# Patient Record
Sex: MALE | Race: WHITE | NOT HISPANIC OR LATINO | Employment: OTHER | URBAN - NONMETROPOLITAN AREA
[De-identification: names, ages, dates, MRNs, and addresses within clinical notes are randomized per-mention and may not be internally consistent; named-entity substitution may affect disease eponyms.]

---

## 2023-03-08 LAB
APPEARANCE, URINE: CLEAR
BILIRUBIN, URINE: NEGATIVE
BLOOD, URINE: NEGATIVE
COLOR, URINE: YELLOW
GLUCOSE, URINE: NEGATIVE MG/DL
KETONES, URINE: NEGATIVE MG/DL
LEUKOCYTE ESTERASE, URINE: NEGATIVE
NITRITE, URINE: NEGATIVE
PH, URINE: 5 (ref 5–8)
PROTEIN, URINE: NEGATIVE MG/DL
SPECIFIC GRAVITY, URINE: 1.02 (ref 1–1.03)
UROBILINOGEN, URINE: <2 MG/DL (ref 0–1.9)

## 2023-06-06 LAB
AMPHETAMINE (PRESENCE) IN URINE BY SCREEN METHOD: NORMAL
BARBITURATES PRESENCE IN URINE BY SCREEN METHOD: NORMAL
BENZODIAZEPINE (PRESENCE) IN URINE BY SCREEN METHOD: NORMAL
CANNABINOIDS IN URINE BY SCREEN METHOD: NORMAL
COCAINE (PRESENCE) IN URINE BY SCREEN METHOD: NORMAL
DRUG SCREEN COMMENT URINE: NORMAL
FENTANYL URINE: NORMAL
METHADONE (PRESENCE) IN URINE BY SCREEN METHOD: NORMAL
OPIATES (PRESENCE) IN URINE BY SCREEN METHOD: NORMAL
OXYCODONE (PRESENCE) IN URINE BY SCREEN METHOD: NORMAL
PHENCYCLIDINE (PRESENCE) IN URINE BY SCREEN METHOD: NORMAL

## 2023-06-19 ENCOUNTER — HOSPITAL ENCOUNTER (OUTPATIENT)
Dept: DATA CONVERSION | Facility: HOSPITAL | Age: 88
End: 2023-06-19
Attending: ANESTHESIOLOGY | Admitting: ANESTHESIOLOGY
Payer: COMMERCIAL

## 2023-06-19 DIAGNOSIS — M48.062 SPINAL STENOSIS, LUMBAR REGION WITH NEUROGENIC CLAUDICATION: ICD-10-CM

## 2023-06-19 DIAGNOSIS — M54.16 RADICULOPATHY, LUMBAR REGION: ICD-10-CM

## 2023-06-19 DIAGNOSIS — M54.17 RADICULOPATHY, LUMBOSACRAL REGION: ICD-10-CM

## 2023-07-24 LAB
ALANINE AMINOTRANSFERASE (SGPT) (U/L) IN SER/PLAS: 12 U/L (ref 10–52)
ALBUMIN (G/DL) IN SER/PLAS: 4.1 G/DL (ref 3.4–5)
ALKALINE PHOSPHATASE (U/L) IN SER/PLAS: 76 U/L (ref 33–136)
ANION GAP IN SER/PLAS: 12 MMOL/L (ref 10–20)
ASPARTATE AMINOTRANSFERASE (SGOT) (U/L) IN SER/PLAS: 13 U/L (ref 9–39)
BASOPHILS (10*3/UL) IN BLOOD BY AUTOMATED COUNT: 0.02 X10E9/L (ref 0–0.1)
BASOPHILS/100 LEUKOCYTES IN BLOOD BY AUTOMATED COUNT: 0.3 % (ref 0–2)
BILIRUBIN TOTAL (MG/DL) IN SER/PLAS: 1.4 MG/DL (ref 0–1.2)
CALCIUM (MG/DL) IN SER/PLAS: 8.8 MG/DL (ref 8.6–10.3)
CARBON DIOXIDE, TOTAL (MMOL/L) IN SER/PLAS: 28 MMOL/L (ref 21–32)
CHLORIDE (MMOL/L) IN SER/PLAS: 99 MMOL/L (ref 98–107)
CREATININE (MG/DL) IN SER/PLAS: 1.4 MG/DL (ref 0.5–1.3)
EOSINOPHILS (10*3/UL) IN BLOOD BY AUTOMATED COUNT: 0.1 X10E9/L (ref 0–0.4)
EOSINOPHILS/100 LEUKOCYTES IN BLOOD BY AUTOMATED COUNT: 1.7 % (ref 0–6)
ERYTHROCYTE DISTRIBUTION WIDTH (RATIO) BY AUTOMATED COUNT: 13.5 % (ref 11.5–14.5)
ERYTHROCYTE MEAN CORPUSCULAR HEMOGLOBIN CONCENTRATION (G/DL) BY AUTOMATED: 33 G/DL (ref 32–36)
ERYTHROCYTE MEAN CORPUSCULAR VOLUME (FL) BY AUTOMATED COUNT: 97 FL (ref 80–100)
ERYTHROCYTES (10*6/UL) IN BLOOD BY AUTOMATED COUNT: 4.36 X10E12/L (ref 4.5–5.9)
GFR MALE: 48 ML/MIN/1.73M2
GLUCOSE (MG/DL) IN SER/PLAS: 98 MG/DL (ref 74–99)
HEMATOCRIT (%) IN BLOOD BY AUTOMATED COUNT: 42.1 % (ref 41–52)
HEMOGLOBIN (G/DL) IN BLOOD: 13.9 G/DL (ref 13.5–17.5)
IMMATURE GRANULOCYTES/100 LEUKOCYTES IN BLOOD BY AUTOMATED COUNT: 0.2 % (ref 0–0.9)
LEUKOCYTES (10*3/UL) IN BLOOD BY AUTOMATED COUNT: 6 X10E9/L (ref 4.4–11.3)
LYMPHOCYTES (10*3/UL) IN BLOOD BY AUTOMATED COUNT: 1.4 X10E9/L (ref 0.8–3)
LYMPHOCYTES/100 LEUKOCYTES IN BLOOD BY AUTOMATED COUNT: 23.3 % (ref 13–44)
MONOCYTES (10*3/UL) IN BLOOD BY AUTOMATED COUNT: 0.66 X10E9/L (ref 0.05–0.8)
MONOCYTES/100 LEUKOCYTES IN BLOOD BY AUTOMATED COUNT: 11 % (ref 2–10)
NEUTROPHILS (10*3/UL) IN BLOOD BY AUTOMATED COUNT: 3.83 X10E9/L (ref 1.6–5.5)
NEUTROPHILS/100 LEUKOCYTES IN BLOOD BY AUTOMATED COUNT: 63.5 % (ref 40–80)
PLATELETS (10*3/UL) IN BLOOD AUTOMATED COUNT: 183 X10E9/L (ref 150–450)
POTASSIUM (MMOL/L) IN SER/PLAS: 4.1 MMOL/L (ref 3.5–5.3)
PROTEIN TOTAL: 6.5 G/DL (ref 6.4–8.2)
SODIUM (MMOL/L) IN SER/PLAS: 135 MMOL/L (ref 136–145)
UREA NITROGEN (MG/DL) IN SER/PLAS: 20 MG/DL (ref 6–23)

## 2023-07-25 LAB
ESTIMATED AVERAGE GLUCOSE FOR HBA1C: 123 MG/DL
HEMOGLOBIN A1C/HEMOGLOBIN TOTAL IN BLOOD: 5.9 %

## 2023-08-07 ENCOUNTER — HOSPITAL ENCOUNTER (OUTPATIENT)
Dept: DATA CONVERSION | Facility: HOSPITAL | Age: 88
End: 2023-08-07
Attending: ANESTHESIOLOGY | Admitting: ANESTHESIOLOGY
Payer: COMMERCIAL

## 2023-08-07 DIAGNOSIS — M47.816 SPONDYLOSIS WITHOUT MYELOPATHY OR RADICULOPATHY, LUMBAR REGION: ICD-10-CM

## 2023-08-07 DIAGNOSIS — M47.817 SPONDYLOSIS WITHOUT MYELOPATHY OR RADICULOPATHY, LUMBOSACRAL REGION: ICD-10-CM

## 2023-08-25 LAB
COLLECTION DURATION OF URINE: 24 HR
CREATININE (MG/24HR) IN 24 HOUR URINE: 1.28 G/24H (ref 0.87–2.41)
CREATININE (MG/DL) IN URINE: 67.2 MG/DL (ref 20–370)
VOLUME OF URINE: 1900 ML

## 2023-09-06 LAB
AMPHETAMINE (PRESENCE) IN URINE BY SCREEN METHOD: NORMAL
BARBITURATES PRESENCE IN URINE BY SCREEN METHOD: NORMAL
BENZODIAZEPINE (PRESENCE) IN URINE BY SCREEN METHOD: NORMAL
CANNABINOIDS IN URINE BY SCREEN METHOD: NORMAL
COCAINE (PRESENCE) IN URINE BY SCREEN METHOD: NORMAL
DRUG SCREEN COMMENT URINE: NORMAL
FENTANYL URINE: NORMAL
OPIATES (PRESENCE) IN URINE BY SCREEN METHOD: NORMAL
OXYCODONE (PRESENCE) IN URINE BY SCREEN METHOD: NORMAL
PHENCYCLIDINE (PRESENCE) IN URINE BY SCREEN METHOD: NORMAL

## 2023-09-07 VITALS — WEIGHT: 258.6 LBS | HEIGHT: 70 IN | BODY MASS INDEX: 37.02 KG/M2

## 2023-09-29 VITALS — HEIGHT: 70 IN | BODY MASS INDEX: 37.02 KG/M2 | WEIGHT: 258.6 LBS

## 2023-09-30 DIAGNOSIS — M43.06 LUMBAR SPONDYLOLYSIS: Primary | ICD-10-CM

## 2023-09-30 RX ORDER — SODIUM CHLORIDE, SODIUM LACTATE, POTASSIUM CHLORIDE, CALCIUM CHLORIDE 600; 310; 30; 20 MG/100ML; MG/100ML; MG/100ML; MG/100ML
100 INJECTION, SOLUTION INTRAVENOUS CONTINUOUS
Status: CANCELLED | OUTPATIENT
Start: 2023-10-02

## 2023-09-30 RX ORDER — CIPROFLOXACIN 500 MG/1
500 TABLET ORAL ONCE
Status: CANCELLED | OUTPATIENT
Start: 2023-10-02

## 2023-10-01 PROBLEM — B35.1 MYCOTIC TOENAILS: Status: ACTIVE | Noted: 2023-10-01

## 2023-10-01 PROBLEM — I10 HYPERTENSION: Status: ACTIVE | Noted: 2023-10-01

## 2023-10-01 PROBLEM — M24.559 HIP FLEXOR TIGHTNESS: Status: ACTIVE | Noted: 2023-10-01

## 2023-10-01 PROBLEM — M54.50 LOW BACK PAIN: Status: ACTIVE | Noted: 2023-10-01

## 2023-10-01 PROBLEM — M54.9 ACUTE BACK PAIN: Status: ACTIVE | Noted: 2023-10-01

## 2023-10-01 PROBLEM — E55.9 VITAMIN D DEFICIENCY: Status: ACTIVE | Noted: 2023-10-01

## 2023-10-01 PROBLEM — I73.9 PVD (PERIPHERAL VASCULAR DISEASE) (CMS-HCC): Status: ACTIVE | Noted: 2023-10-01

## 2023-10-01 PROBLEM — Z91.89 AT RISK FOR BLEEDING: Status: ACTIVE | Noted: 2023-10-01

## 2023-10-01 PROBLEM — N40.0 BPH (BENIGN PROSTATIC HYPERPLASIA): Status: ACTIVE | Noted: 2023-10-01

## 2023-10-01 PROBLEM — G89.29 CHRONIC TOE PAIN, BILATERAL: Status: ACTIVE | Noted: 2023-10-01

## 2023-10-01 PROBLEM — M10.9 GOUT: Status: ACTIVE | Noted: 2023-10-01

## 2023-10-01 PROBLEM — M79.674 CHRONIC TOE PAIN, BILATERAL: Status: ACTIVE | Noted: 2023-10-01

## 2023-10-01 PROBLEM — M79.675 CHRONIC TOE PAIN, BILATERAL: Status: ACTIVE | Noted: 2023-10-01

## 2023-10-01 PROBLEM — E78.5 HLD (HYPERLIPIDEMIA): Status: ACTIVE | Noted: 2023-10-01

## 2023-10-01 PROBLEM — R06.89 IMPAIRED GAS EXCHANGE: Status: ACTIVE | Noted: 2023-10-01

## 2023-10-01 PROBLEM — I82.90 VENOUS THROMBOSIS: Status: ACTIVE | Noted: 2023-10-01

## 2023-10-01 PROBLEM — B35.3 TINEA PEDIS OF BOTH FEET: Status: ACTIVE | Noted: 2023-10-01

## 2023-10-01 RX ORDER — ASPIRIN 81 MG/1
1 TABLET ORAL DAILY
COMMUNITY
Start: 2018-02-20

## 2023-10-01 RX ORDER — CIPROFLOXACIN 500 MG/1
500 TABLET ORAL 2 TIMES DAILY
COMMUNITY
Start: 2023-05-19 | End: 2023-11-24 | Stop reason: WASHOUT

## 2023-10-01 RX ORDER — HYDROCODONE BITARTRATE AND ACETAMINOPHEN 5; 325 MG/1; MG/1
1 TABLET ORAL EVERY 4 HOURS PRN
COMMUNITY
End: 2023-11-24 | Stop reason: WASHOUT

## 2023-10-01 RX ORDER — ALLOPURINOL 300 MG/1
300 TABLET ORAL DAILY
COMMUNITY

## 2023-10-01 RX ORDER — MELOXICAM 7.5 MG/1
7.5 TABLET ORAL DAILY
COMMUNITY
Start: 2023-07-18 | End: 2023-11-24 | Stop reason: WASHOUT

## 2023-10-01 RX ORDER — TAMSULOSIN HYDROCHLORIDE 0.4 MG/1
0.4 CAPSULE ORAL DAILY
COMMUNITY

## 2023-10-01 RX ORDER — LOVASTATIN 40 MG/1
40 TABLET ORAL NIGHTLY
COMMUNITY

## 2023-10-01 RX ORDER — LISINOPRIL AND HYDROCHLOROTHIAZIDE 12.5; 2 MG/1; MG/1
1 TABLET ORAL DAILY
COMMUNITY

## 2023-10-01 RX ORDER — GABAPENTIN 100 MG/1
100 CAPSULE ORAL NIGHTLY
COMMUNITY
Start: 2023-07-08 | End: 2023-11-24 | Stop reason: WASHOUT

## 2023-10-01 RX ORDER — ERGOCALCIFEROL 1.25 MG/1
1 CAPSULE ORAL
COMMUNITY
Start: 2016-03-01 | End: 2023-11-24 | Stop reason: WASHOUT

## 2023-10-01 RX ORDER — LISINOPRIL 20 MG/1
20 TABLET ORAL DAILY
COMMUNITY
End: 2023-11-24 | Stop reason: WASHOUT

## 2023-10-01 NOTE — OP NOTE
Post Operative Note:     PreOp Diagnosis: Lumbar spondylosis   Post-Procedure Diagnosis: Same   Procedure: 1.  Bilateral diagnostic medial branch  nerve block L4-5 L5-S1 with fluoroscopy  2.   3.   4.   5.   Surgeon: Dr. Diggs   Resident/Fellow/Other Assistant: None   Anesthesia: IV conscious sedation   Estimated Blood Loss (mL): none   Specimen: no   Findings: None     Operative Report Dictated:  Dictation: not applicable - note contains Operative  Report   Operative Report:    89-year-old  male here today for diagnostic medial branch nerve block.  Risk and benefits of today's procedure as well as COVID-19 was discussed and  a signed consent was obtained prior to his entry into the operating room.  Operation Performed: Diagnostic bilateral L4-5 L5-S1 facet joint injection with fluoroscopic guidance.  Preoperative/postop diagnosis:  Lumbar spondylosis without myelopathy L4-5, L5-S1     Complications: None  Estimated blood loss: None  Anesthesia: IV conscious sedation    Procedure:  The patient was taken to the operating room and placed in the prone position where sterile prep was done and drapes were applied. Supplemental oxygen was given to the patient at 2 L/m  per nasal cannula. Patients blood pressure was monitored  throughout the case and remained stable. Pulse oximetry remained at 100% throughout the procedure. A  25-gauge 3 1/2 inch spinal needle was then inserted into the inferior aspect of the facet joints at L4-5 L5-S1. Aspiration was negative for the cerebral  spinal fluid or blood. There were no paresthesias, dysesthesias, or severe pain noted. A total of 1mL of preservative-free 0.25% Marcaine was injected into each of the 4 facet joints. The needles were then removed and a sterile bandage with Neosporin  Ointment was applied over the puncture sites. The patient was taken to the recovery room in awake and in stable condition.      Electronic Signatures:  Duong Diggs ()  (Signed  07-Aug-2023 10:41)   Authored: Post Operative Note, Note Completion      Last Updated: 07-Aug-2023 10:41 by Duong Diggs (DO)

## 2023-10-02 NOTE — OP NOTE
Post Operative Note:     PreOp Diagnosis: Lumbosacral radiculopathy   Post-Procedure Diagnosis: Same   Procedure: 1.  Lumbar epidural steroid injection  # 2 with fluoroscopy  2.   3.   4.   5.   Surgeon: Dr. Diggs   Resident/Fellow/Other Assistant: None   Anesthesia: IV conscious sedation   Estimated Blood Loss (mL): none   Specimen: no   Findings: None     Operative Report Dictated:  Dictation: not applicable - note contains Operative  Report   Operative Report:    89-year-old  male here today to undergo a lumbar MYRANDA #2 with fluoroscopy.  He has severe spinal stenosis at L4-5 L5-S1 level resulting in lumbosacral  radiculopathy.  Risk and benefits of today's procedure as well as COVID-19 was discussed with him and a signed consent was obtained prior to him entering the OR.  Operation Performed:  Lumbar epidural steroid injection with fluoroscopic guidance.  Preoperative/postop diagnosis: Lumbosacral radiculopathy  Complications: None  Anesthesia: IV conscious sedation  Estimated blood loss: None  Procedure:  Risk and benefits of the procedure were discussed with the patient, who then consented to the procedure.  The patient was taken to the operating room and placed in the prone position where a sterile prep was done and drapes were applied.     Supplemental oxygen was given to the patient at 2 L/m and blood pressure was monitored throughout the case. Pulse oximetry remained at 100% throughout the procedure. An 18-gauge Tuohy needle was inserted into the lumbar epidural space at L5-S1.  Aspiration  was negative for cerebrospinal fluid or blood. There were no paresthesias dysesthesias or severe pain noted. Two mL of Omnipaque 240 mg contrast media was injected and outlined the epidural space. This was visualized in both AP and lateral views. Methylprednisolone  acetate (Depo-Medrol) 80 milligrams in 0.9% sodium chloride 10 mL was then injected and the needle was removed. A sterile bandage with  Neosporin ointment was applied over the puncture site, and the patient was taken to the recovery room in stable and  satisfactory condition. Home-going instructions were given to the patient prior to discharge.      Electronic Signatures:  Duong Diggs ()  (Signed 19-Jun-2023 09:57)   Authored: Post Operative Note, Note Completion      Last Updated: 19-Jun-2023 09:57 by Duong Diggs ()

## 2023-10-19 DIAGNOSIS — M47.817 FACET ARTHROPATHY, LUMBOSACRAL: Primary | ICD-10-CM

## 2023-10-23 ENCOUNTER — APPOINTMENT (OUTPATIENT)
Dept: PAIN MEDICINE | Facility: HOSPITAL | Age: 88
End: 2023-10-23
Payer: MEDICARE

## 2023-11-01 ENCOUNTER — OFFICE VISIT (OUTPATIENT)
Dept: PODIATRY | Facility: CLINIC | Age: 88
End: 2023-11-01
Payer: MEDICARE

## 2023-11-01 DIAGNOSIS — M79.671 FOOT PAIN, BILATERAL: ICD-10-CM

## 2023-11-01 DIAGNOSIS — I73.9 PVD (PERIPHERAL VASCULAR DISEASE) (CMS-HCC): Primary | ICD-10-CM

## 2023-11-01 DIAGNOSIS — B35.1 MYCOTIC TOENAILS: ICD-10-CM

## 2023-11-01 DIAGNOSIS — M79.672 FOOT PAIN, BILATERAL: ICD-10-CM

## 2023-11-01 PROCEDURE — 3078F DIAST BP <80 MM HG: CPT | Performed by: PODIATRIST

## 2023-11-01 PROCEDURE — 11721 DEBRIDE NAIL 6 OR MORE: CPT | Performed by: PODIATRIST

## 2023-11-01 PROCEDURE — 3077F SYST BP >= 140 MM HG: CPT | Performed by: PODIATRIST

## 2023-11-01 PROCEDURE — 1160F RVW MEDS BY RX/DR IN RCRD: CPT | Performed by: PODIATRIST

## 2023-11-01 PROCEDURE — 1159F MED LIST DOCD IN RCRD: CPT | Performed by: PODIATRIST

## 2023-11-01 PROCEDURE — 1125F AMNT PAIN NOTED PAIN PRSNT: CPT | Performed by: PODIATRIST

## 2023-11-01 NOTE — PROGRESS NOTES
This is a 89 y.o. male est patient for foot pain    History of Present Illness:   Patient states they are here for foot exam  Denies NTB to feet  Unable to safely trim nails on own      Past Medical History  Past Medical History:   Diagnosis Date    Personal history of other diseases of the musculoskeletal system and connective tissue 10/13/2015    History of gout    Personal history of other endocrine, nutritional and metabolic disease 10/13/2015    History of hyperlipidemia    Personal history of other malignant neoplasm of large intestine 10/13/2015    History of malignant neoplasm of colon    Personal history of urinary (tract) infections 04/20/2017    History of urinary tract infection       Medications and Allergies have been reviewed.    Review Of Systems:  GENERAL: No weight loss, malaise or fevers.  HEENT: Negative for frequent or significant headaches,   RESPIRATORY: Negative for cough, wheezing or shortness of breath.  CARDIOVASCULAR: Negative for chest pain, leg swelling or palpitations.    Physical Exam:  Patient is a pleasant, cooperative, well developed 89 y.o.  adult male. The patient is alert and oriented to time, place and person.   Patient has normal affect and mood.    Examination of Both Lower Extremities:   Objective:   Vasc: DP and PT pulses are palpable bilateral.  CFT is less than 3 seconds bilateral.  Skin temperature is warm to cool proximal to distal bilateral.  Varicosities noted. NO hair growth noted    Neuro: Vibratory, light touch and proprioception are intact bilateral.      Derm: Nails 1-5 bilateral are intact thick and discolored.  Skin is supple with normal texture and turgor noted.  Webspaces are clean, dry and intact bilateral.  There are no hyperkeratoses, ulcerations, verruca or other lesions noted.      Ortho: Muscle strength is 5/5 for all pedal groups tested.      A comprehensive history and physical exam was performed. The patient was educated on clinical and radiographic  findings, diagnosis, and treatment plans.    1. PVD (peripheral vascular disease) (CMS/HCC)        2. Mycotic toenails        3. Foot pain, bilateral              Patient educated on proper  foot care.  Nails 1-5 b/l were debrided in thickness and length with nail cutting forceps.  Ok to keep trimmed and filed down.   Patient to follow up in 6 mos or sooner if any problems arise.   Patient was in agreement to this plan. All questions answered.      Kika Wan DPM  [phone]  [facility]  Fax: [phone]

## 2023-11-09 PROBLEM — M47.817 FACET ARTHROPATHY, LUMBOSACRAL: Status: ACTIVE | Noted: 2023-11-09

## 2023-11-13 ENCOUNTER — HOSPITAL ENCOUNTER (OUTPATIENT)
Dept: PAIN MEDICINE | Facility: HOSPITAL | Age: 88
Discharge: HOME | End: 2023-11-13
Payer: MEDICARE

## 2023-11-13 ENCOUNTER — APPOINTMENT (OUTPATIENT)
Dept: PAIN MEDICINE | Facility: HOSPITAL | Age: 88
End: 2023-11-13
Payer: COMMERCIAL

## 2023-11-13 ENCOUNTER — HOSPITAL ENCOUNTER (OUTPATIENT)
Dept: RADIOLOGY | Facility: HOSPITAL | Age: 88
Discharge: HOME | End: 2023-11-13
Payer: MEDICARE

## 2023-11-13 VITALS
RESPIRATION RATE: 17 BRPM | TEMPERATURE: 98.2 F | OXYGEN SATURATION: 97 % | HEIGHT: 68 IN | WEIGHT: 259 LBS | DIASTOLIC BLOOD PRESSURE: 73 MMHG | HEART RATE: 75 BPM | BODY MASS INDEX: 39.25 KG/M2 | SYSTOLIC BLOOD PRESSURE: 150 MMHG

## 2023-11-13 DIAGNOSIS — M54.50 CHRONIC BILATERAL LOW BACK PAIN WITHOUT SCIATICA: ICD-10-CM

## 2023-11-13 DIAGNOSIS — M47.817 FACET ARTHROPATHY, LUMBOSACRAL: Primary | ICD-10-CM

## 2023-11-13 DIAGNOSIS — M43.06 LUMBAR SPONDYLOLYSIS: ICD-10-CM

## 2023-11-13 DIAGNOSIS — G89.29 CHRONIC BILATERAL LOW BACK PAIN WITHOUT SCIATICA: ICD-10-CM

## 2023-11-13 DIAGNOSIS — M47.816 LUMBAR FACET ARTHROPATHY: Primary | ICD-10-CM

## 2023-11-13 PROCEDURE — 64494 INJ PARAVERT F JNT L/S 2 LEV: CPT | Performed by: ANESTHESIOLOGY

## 2023-11-13 PROCEDURE — 7100000009 HC PHASE TWO TIME - INITIAL BASE CHARGE

## 2023-11-13 PROCEDURE — 64494 INJ PARAVERT F JNT L/S 2 LEV: CPT

## 2023-11-13 PROCEDURE — 7100000010 HC PHASE TWO TIME - EACH INCREMENTAL 1 MINUTE

## 2023-11-13 PROCEDURE — 77003 FLUOROGUIDE FOR SPINE INJECT: CPT

## 2023-11-13 PROCEDURE — 2500000004 HC RX 250 GENERAL PHARMACY W/ HCPCS (ALT 636 FOR OP/ED): Performed by: NURSE PRACTITIONER

## 2023-11-13 PROCEDURE — 64493 INJ PARAVERT F JNT L/S 1 LEV: CPT | Performed by: ANESTHESIOLOGY

## 2023-11-13 PROCEDURE — 2500000001 HC RX 250 WO HCPCS SELF ADMINISTERED DRUGS (ALT 637 FOR MEDICARE OP): Performed by: NURSE PRACTITIONER

## 2023-11-13 PROCEDURE — 2500000004 HC RX 250 GENERAL PHARMACY W/ HCPCS (ALT 636 FOR OP/ED): Performed by: ANESTHESIOLOGY

## 2023-11-13 PROCEDURE — 64493 INJ PARAVERT F JNT L/S 1 LEV: CPT

## 2023-11-13 RX ORDER — CIPROFLOXACIN 500 MG/1
500 TABLET ORAL ONCE
Status: COMPLETED | OUTPATIENT
Start: 2023-11-13 | End: 2023-11-13

## 2023-11-13 RX ORDER — CIPROFLOXACIN 500 MG/1
TABLET ORAL
Status: DISPENSED
Start: 2023-11-13 | End: 2023-11-13

## 2023-11-13 RX ORDER — MIDAZOLAM HYDROCHLORIDE 1 MG/ML
INJECTION, SOLUTION INTRAMUSCULAR; INTRAVENOUS AS NEEDED
Status: COMPLETED | OUTPATIENT
Start: 2023-11-13 | End: 2023-11-13

## 2023-11-13 RX ORDER — FENTANYL CITRATE 50 UG/ML
INJECTION, SOLUTION INTRAMUSCULAR; INTRAVENOUS AS NEEDED
Status: COMPLETED | OUTPATIENT
Start: 2023-11-13 | End: 2023-11-13

## 2023-11-13 RX ORDER — SODIUM CHLORIDE, SODIUM LACTATE, POTASSIUM CHLORIDE, CALCIUM CHLORIDE 600; 310; 30; 20 MG/100ML; MG/100ML; MG/100ML; MG/100ML
100 INJECTION, SOLUTION INTRAVENOUS CONTINUOUS
Status: DISCONTINUED | OUTPATIENT
Start: 2023-11-13 | End: 2023-11-14 | Stop reason: HOSPADM

## 2023-11-13 RX ADMIN — CIPROFLOXACIN 500 MG: 500 TABLET, FILM COATED ORAL at 10:45

## 2023-11-13 RX ADMIN — SODIUM CHLORIDE, POTASSIUM CHLORIDE, SODIUM LACTATE AND CALCIUM CHLORIDE 100 ML/HR: 600; 310; 30; 20 INJECTION, SOLUTION INTRAVENOUS at 10:46

## 2023-11-13 RX ADMIN — FENTANYL CITRATE 50 MCG: 50 INJECTION, SOLUTION INTRAMUSCULAR; INTRAVENOUS at 11:22

## 2023-11-13 RX ADMIN — MIDAZOLAM 2 MG: 1 INJECTION INTRAMUSCULAR; INTRAVENOUS at 11:22

## 2023-11-13 ASSESSMENT — ENCOUNTER SYMPTOMS
ABDOMINAL PAIN: 0
BACK PAIN: 1
SINUS PRESSURE: 0
TROUBLE SWALLOWING: 0
CONSTIPATION: 0
COUGH: 0
WEAKNESS: 0
FLANK PAIN: 0
SORE THROAT: 0
RHINORRHEA: 0
VOMITING: 0
NUMBNESS: 0
FEVER: 0
NAUSEA: 0
CHILLS: 0
ABDOMINAL DISTENTION: 0
FREQUENCY: 0
DIARRHEA: 0
FATIGUE: 0
CHEST TIGHTNESS: 0
SINUS PAIN: 0
SHORTNESS OF BREATH: 0

## 2023-11-13 ASSESSMENT — PAIN - FUNCTIONAL ASSESSMENT
PAIN_FUNCTIONAL_ASSESSMENT: 0-10

## 2023-11-13 ASSESSMENT — COLUMBIA-SUICIDE SEVERITY RATING SCALE - C-SSRS
1. IN THE PAST MONTH, HAVE YOU WISHED YOU WERE DEAD OR WISHED YOU COULD GO TO SLEEP AND NOT WAKE UP?: NO
6. HAVE YOU EVER DONE ANYTHING, STARTED TO DO ANYTHING, OR PREPARED TO DO ANYTHING TO END YOUR LIFE?: NO
2. HAVE YOU ACTUALLY HAD ANY THOUGHTS OF KILLING YOURSELF?: NO

## 2023-11-13 ASSESSMENT — PAIN DESCRIPTION - DESCRIPTORS: DESCRIPTORS: ACHING

## 2023-11-13 ASSESSMENT — PAIN SCALES - GENERAL
PAINLEVEL_OUTOF10: 3
PAINLEVEL_OUTOF10: 0 - NO PAIN

## 2023-11-13 NOTE — H&P
History Of Present Illness  Talib Washington is a 89 y.o. male presenting with low back pain.  He states this is his third injection and the first two have helped relieve some of his pain/discomfort.  States his pain today is 3/10 and describes it as a stinging sensation.  Patient denies any sob, cp, n/v/d, difficulty with urination or bm's.      Past Medical History  Past Medical History:   Diagnosis Date    Hypertension     Personal history of other diseases of the musculoskeletal system and connective tissue 10/13/2015    History of gout    Personal history of other endocrine, nutritional and metabolic disease 10/13/2015    History of hyperlipidemia    Personal history of other malignant neoplasm of large intestine 10/13/2015    History of malignant neoplasm of colon    Personal history of urinary (tract) infections 04/20/2017    History of urinary tract infection       Surgical History  Past Surgical History:   Procedure Laterality Date    TOTAL HIP ARTHROPLASTY  10/13/2015    Total Hip Replacement        Social History  He reports that he has never smoked. He has never used smokeless tobacco. He reports current alcohol use. He reports that he does not use drugs.    Family History  Family History   Problem Relation Name Age of Onset    Diabetes Mother          Allergies  Patient has no known allergies.    Review of Systems   Constitutional:  Negative for chills, fatigue and fever.   HENT:  Negative for congestion, ear discharge, ear pain, postnasal drip, rhinorrhea, sinus pressure, sinus pain, sneezing, sore throat, tinnitus and trouble swallowing.    Respiratory:  Negative for cough, chest tightness and shortness of breath.    Cardiovascular:  Negative for chest pain.   Gastrointestinal:  Negative for abdominal distention, abdominal pain, constipation, diarrhea, nausea and vomiting.   Genitourinary:  Negative for flank pain, frequency and urgency.   Musculoskeletal:  Positive for back pain.        Low back pain  "  Skin:  Negative for rash.   Neurological:  Negative for weakness and numbness.   All other systems reviewed and are negative.       Physical Exam  Vitals and nursing note reviewed.   Constitutional:       Appearance: Normal appearance.   HENT:      Head: Normocephalic.      Nose: Nose normal.      Mouth/Throat:      Mouth: Mucous membranes are moist.   Eyes:      Pupils: Pupils are equal, round, and reactive to light.   Cardiovascular:      Rate and Rhythm: Normal rate and regular rhythm.   Pulmonary:      Effort: Pulmonary effort is normal.      Breath sounds: Normal breath sounds.   Abdominal:      General: Bowel sounds are normal.      Palpations: Abdomen is soft.   Musculoskeletal:      Lumbar back: Spasms and tenderness present. No bony tenderness. Decreased range of motion. Negative right straight leg raise test and negative left straight leg raise test.        Back:    Skin:     General: Skin is warm and dry.      Capillary Refill: Capillary refill takes less than 2 seconds.   Neurological:      General: No focal deficit present.      Mental Status: He is alert.   Psychiatric:         Mood and Affect: Mood normal.          Last Recorded Vitals  Blood pressure 165/68, pulse 80, temperature 36.3 °C (97.3 °F), temperature source Temporal, resp. rate 17, height 1.727 m (5' 8\"), weight 117 kg (259 lb), SpO2 99 %.    Relevant Results             Assessment/Plan   Lumbosacral facet arthropathy/Bilateral MBNB             I spent 20 minutes in the professional and overall care of this patient.      Crystal L Severino, PATY-CNP    "

## 2023-11-13 NOTE — DISCHARGE INSTRUCTIONS
Discharge Instructions:  Keep band-aid on for the next 24 hours.  No showering/bathing for the next 24 hours. You may notice soreness or increased pain in the area of your injection, which may continue for the first 48 hours.  Avoid driving or operating any heavy machinery until the next day after the procedure.  You should resume any medications and your regular diet after the procedure. Some of the side affects you may experience from the steroids are: Insomnia (inability to sleep), Increased sweating, Headaches, Increased fluid retention (swelling of your extremities), Increased appetite, Face flushing, If you are a diabetic, your blood sugars may go up.  Closely monitor your diet.  Your blood sugar should return to normal in a few days.  Complications: If the discomfort persists, apply moist heat to the area.  Serious complications are very uncommon but may include bleeding, infection or nerve damage. If sever pain, fever, redness or swelling near the injection site, have someone take you to the nearest emergency room to be evaluated for procedure complications or infection. Expectations: Local anesthetics wear off in several hours but duration of relief varies from individual to individual.  If you have any questions, please call the office at [phone].  If this is an emergency, please go to the nearest emergency room.

## 2023-11-13 NOTE — OP NOTE
Date: 2023  OR Location: [facility]OR PROCEDURES    Name: Talib Washington, : 3/25/1934, Age: 89 y.o., MRN: [id number], Sex: male    Diagnosis   1. Facet arthropathy, lumbosacral   Medial Nerve Branch Block     Medial Nerve Branch Block      2. Chronic bilateral low back pain without sciatica        3. Lumbar spondylolysis  Vital Signs    Insert peripheral IV    Saline lock IV    FL pain management TC    lactated Ringer's infusion    NPO Diet; Effective now    Vital Signs    Insert peripheral IV    Saline lock IV    FL pain management TC    NPO Diet; Effective now        Operation Performed: Diagnostic Medial Branch Block    Facet joint injection with fluoroscopic guidance.  Preoperative/postop diagnosis:  Lumbar spondylosis without myelopathy L4-L5, L5-S1 Bilateral     Complications: None  Estimated blood loss: None  Anesthesia: IV Conscious sedation plus Local    Procedures     Procedure:  Patient was identified with name , , and medical record number.   Appropriate timeout was performed preoperatively.  The patient was taken to the operating room and placed in the prone position where sterile prep was done and drapes were applied. Supplemental oxygen was given to the patient at 2 L/m  per nasal cannula. Patients blood pressure was monitored throughout the case and remained stable. Pulse oximetry remained at 100% throughout the procedure. A  25-gauge 3 1/2 inch spinal needle was then inserted into the inferior aspect of the facet joints at  L4-L5, L5-S1 Bilateral  . Aspiration was negative for the cerebral spinal fluid or blood. There were no paresthesias, dysesthesias, or severe pain noted. A total of 1mL of preservative-free 0.25% Marcaine was injected into each of the L4-L5, L5-S1 Bilateral  facet joints. The needles were then removed and a sterile bandage with Neosporin Ointment was applied over the puncture sites. The patient was taken to the recovery room in awake and in stable condition.

## 2023-11-24 ENCOUNTER — OFFICE VISIT (OUTPATIENT)
Dept: SURGERY | Facility: CLINIC | Age: 88
End: 2023-11-24
Payer: MEDICARE

## 2023-11-24 VITALS
HEART RATE: 69 BPM | BODY MASS INDEX: 38.06 KG/M2 | WEIGHT: 257 LBS | SYSTOLIC BLOOD PRESSURE: 136 MMHG | TEMPERATURE: 97.2 F | HEIGHT: 69 IN | DIASTOLIC BLOOD PRESSURE: 70 MMHG

## 2023-11-24 DIAGNOSIS — Z90.49 HISTORY OF PARTIAL SURGICAL REMOVAL OF COLON: ICD-10-CM

## 2023-11-24 DIAGNOSIS — K59.09 OTHER CONSTIPATION: ICD-10-CM

## 2023-11-24 DIAGNOSIS — Z85.038 HISTORY OF COLON CANCER: ICD-10-CM

## 2023-11-24 DIAGNOSIS — K62.5 RECTAL BLEEDING: Primary | ICD-10-CM

## 2023-11-24 PROCEDURE — 3078F DIAST BP <80 MM HG: CPT | Performed by: SURGERY

## 2023-11-24 PROCEDURE — 1160F RVW MEDS BY RX/DR IN RCRD: CPT | Performed by: SURGERY

## 2023-11-24 PROCEDURE — 1159F MED LIST DOCD IN RCRD: CPT | Performed by: SURGERY

## 2023-11-24 PROCEDURE — 99204 OFFICE O/P NEW MOD 45 MIN: CPT | Performed by: SURGERY

## 2023-11-24 PROCEDURE — 1125F AMNT PAIN NOTED PAIN PRSNT: CPT | Performed by: SURGERY

## 2023-11-24 PROCEDURE — 3075F SYST BP GE 130 - 139MM HG: CPT | Performed by: SURGERY

## 2023-11-24 PROCEDURE — 1036F TOBACCO NON-USER: CPT | Performed by: SURGERY

## 2023-11-24 ASSESSMENT — ENCOUNTER SYMPTOMS
ANAL BLEEDING: 1
CONSTIPATION: 1

## 2023-11-24 NOTE — PROGRESS NOTES
Subjective   Patient ID: Talib Washington is a 89 y.o. male who presents for rectal bleeding.    HPI   This is an 89-year-old male on Eliquis who is status post sigmoid resection 5.21.2014 at [facility].  This was performed for sigmoid carcinoma.  The patient had a subsequent follow-up colonoscopy 1 year later which was unremarkable.  He had a flexible sigmoidoscopy 11.13 2020 which was also normal.  He has been doing well since this time and recently had some rectal bleeding with significant constipation.  The bleeding is now stopped.  He is having no other complaints.  Past Medical History:   Diagnosis Date    Hypertension     Personal history of other diseases of the musculoskeletal system and connective tissue 10/13/2015    History of gout    Personal history of other endocrine, nutritional and metabolic disease 10/13/2015    History of hyperlipidemia    Personal history of other malignant neoplasm of large intestine 10/13/2015    History of malignant neoplasm of colon    Personal history of urinary (tract) infections 04/20/2017    History of urinary tract infection        Current Outpatient Medications on File Prior to Visit   Medication Sig Dispense Refill    allopurinol (Zyloprim) 300 mg tablet Take 1 tablet (300 mg) by mouth once daily.      apixaban (Eliquis) 2.5 mg tablet Take 1 tablet (2.5 mg) by mouth 2 times a day.      aspirin 81 mg EC tablet Take 1 tablet (81 mg) by mouth once daily.      lisinopriL-hydrochlorothiazide 20-12.5 mg tablet Take 1 tablet by mouth once daily.      lovastatin (Mevacor) 40 mg tablet Take 1 tablet (40 mg) by mouth once daily at bedtime.      tamsulosin (Flomax) 0.4 mg 24 hr capsule Take 1 capsule (0.4 mg) by mouth once daily.      [DISCONTINUED] ciprofloxacin (Cipro) 500 mg tablet Take 1 tablet (500 mg) by mouth 2 times a day.      [DISCONTINUED] ergocalciferol (Vitamin D-2) 1.25 MG (79003 UT) capsule Take 1 capsule (1,250 mcg) by mouth 1 (one) time per week.      [DISCONTINUED]  gabapentin (Neurontin) 100 mg capsule Take 1 capsule (100 mg) by mouth once daily at bedtime.      [DISCONTINUED] HYDROcodone-acetaminophen (Norco) 5-325 mg tablet Take 1 tablet by mouth every 4 hours if needed for moderate pain (4 - 6).      [DISCONTINUED] lisinopril 20 mg tablet Take 1 tablet (20 mg) by mouth once daily.      [DISCONTINUED] meloxicam (Mobic) 7.5 mg tablet Take 1 tablet (7.5 mg) by mouth once daily.       No current facility-administered medications on file prior to visit.        Review of Systems   Gastrointestinal:  Positive for anal bleeding and constipation.       Vitals:    11/24/23 1057   BP: 136/70   Pulse: 69   Temp: 36.2 °C (97.2 °F)        Objective     Physical Exam  Vitals reviewed.   Constitutional:       Appearance: Normal appearance.   HENT:      Head: Normocephalic.   Cardiovascular:      Rate and Rhythm: Normal rate and regular rhythm.      Heart sounds: Normal heart sounds.   Pulmonary:      Effort: Pulmonary effort is normal.      Breath sounds: Normal breath sounds.   Abdominal:      General: Abdomen is flat. There is no distension.      Palpations: Abdomen is soft. There is no mass.      Tenderness: There is no abdominal tenderness. There is no guarding.      Hernia: No hernia is present.      Comments: Rectal examination was heme-negative.   Genitourinary:     Testes: Normal.   Musculoskeletal:         General: Normal range of motion.      Cervical back: Normal range of motion.      Comments: Has some limited range of motion.   Skin:     General: Skin is warm.   Neurological:      General: No focal deficit present.   Psychiatric:         Mood and Affect: Mood normal.         Problem List Items Addressed This Visit       Rectal bleeding - Primary    Other constipation    History of partial surgical removal of colon    History of colon cancer        Assessment/Plan   11 years status post partial sigmoidectomy for stage I colon cancer.  Recent negative colonoscopy in 2020.   Resolved rectal bleeding.  On Eliquis.  Elderly.    Plan-I discussed the findings with the patient at this time I do not feel that he requires any further intervention.  His bleeding is resolved.  This is likely due to constipation.  I would recommend a high-fiber diet.     Thanh Felix MD

## 2023-11-24 NOTE — PATIENT INSTRUCTIONS
Your bleeding is likely related to your constipation.  I ensure that you obtain at least 25 to 30 g of fiber per day.  You can take an oral Dulcolax pill daily.  Prune juice is also helpful.

## 2024-01-09 ENCOUNTER — OFFICE VISIT (OUTPATIENT)
Dept: PAIN MEDICINE | Facility: HOSPITAL | Age: 89
End: 2024-01-09
Payer: MEDICARE

## 2024-01-09 VITALS
TEMPERATURE: 97.8 F | HEART RATE: 103 BPM | DIASTOLIC BLOOD PRESSURE: 74 MMHG | HEIGHT: 70 IN | BODY MASS INDEX: 37.51 KG/M2 | WEIGHT: 262 LBS | SYSTOLIC BLOOD PRESSURE: 141 MMHG

## 2024-01-09 DIAGNOSIS — M47.817 FACET ARTHROPATHY, LUMBOSACRAL: Primary | ICD-10-CM

## 2024-01-09 DIAGNOSIS — Z79.899 MEDICATION MANAGEMENT: ICD-10-CM

## 2024-01-09 LAB
AMPHETAMINES UR QL SCN: NORMAL
BARBITURATES UR QL SCN: NORMAL
BENZODIAZ UR QL SCN: NORMAL
BZE UR QL SCN: NORMAL
CANNABINOIDS UR QL SCN: NORMAL
FENTANYL+NORFENTANYL UR QL SCN: NORMAL
OPIATES UR QL SCN: NORMAL
OXYCODONE+OXYMORPHONE UR QL SCN: NORMAL
PCP UR QL SCN: NORMAL

## 2024-01-09 PROCEDURE — 99213 OFFICE O/P EST LOW 20 MIN: CPT | Performed by: NURSE PRACTITIONER

## 2024-01-09 PROCEDURE — 1160F RVW MEDS BY RX/DR IN RCRD: CPT | Performed by: NURSE PRACTITIONER

## 2024-01-09 PROCEDURE — 1125F AMNT PAIN NOTED PAIN PRSNT: CPT | Performed by: NURSE PRACTITIONER

## 2024-01-09 PROCEDURE — 3078F DIAST BP <80 MM HG: CPT | Performed by: NURSE PRACTITIONER

## 2024-01-09 PROCEDURE — 80307 DRUG TEST PRSMV CHEM ANLYZR: CPT | Performed by: NURSE PRACTITIONER

## 2024-01-09 PROCEDURE — 99213 OFFICE O/P EST LOW 20 MIN: CPT | Mod: ZK | Performed by: NURSE PRACTITIONER

## 2024-01-09 PROCEDURE — 1036F TOBACCO NON-USER: CPT | Performed by: NURSE PRACTITIONER

## 2024-01-09 PROCEDURE — 1159F MED LIST DOCD IN RCRD: CPT | Performed by: NURSE PRACTITIONER

## 2024-01-09 PROCEDURE — 3077F SYST BP >= 140 MM HG: CPT | Performed by: NURSE PRACTITIONER

## 2024-01-09 RX ORDER — GABAPENTIN 100 MG/1
100 CAPSULE ORAL NIGHTLY
Qty: 90 CAPSULE | Refills: 2 | Status: SHIPPED | OUTPATIENT
Start: 2024-01-09

## 2024-01-09 ASSESSMENT — ENCOUNTER SYMPTOMS
JOINT SWELLING: 0
BACK PAIN: 1
RESPIRATORY NEGATIVE: 1
NECK PAIN: 0
CONSTITUTIONAL NEGATIVE: 1
MYALGIAS: 1
PSYCHIATRIC NEGATIVE: 1
GASTROINTESTINAL NEGATIVE: 1
CARDIOVASCULAR NEGATIVE: 1
ALLERGIC/IMMUNOLOGIC NEGATIVE: 1
EYES NEGATIVE: 1
ARTHRALGIAS: 1
NECK STIFFNESS: 0
NEUROLOGICAL NEGATIVE: 1
ENDOCRINE NEGATIVE: 1

## 2024-01-09 ASSESSMENT — PAIN SCALES - GENERAL: PAINLEVEL: 3

## 2024-01-09 NOTE — PROGRESS NOTES
I have personally reviewed the OARRS report for Talib Washington   . I have considered the risks of abuse, dependence, addiction and diversion.   Is the patient prescribed a combination of a benzodiazepine and opioid? No  Patient tolerating without AE. Benefit outweighs the risk. Discussed risks/benefits with patient. All questions answered. States understanding.     Date of the last Controlled Substance Agreement: 1/9/2024    Last urine drug screening date/ordered today: 01/09/24  Results of last screen: Results as expected.     OPIOID   What is the patient’s goal of therapy? PAIN CONTROL.  Is this being achieved with current treatment? Yes  Attestation statement: I feel that it is clinically indicated to continue this current medication regimen after consideration of alternative therapies, and other non-opioid treatments.     Opioid Risk Screening:   THE OPIOID RISK TOOL (ORT)                               Female                     Male    Alcohol                            [1] =                          [3] = 0  Illegal Drugs                           [2] =                           [3]  = 0    1. Family History of Substance Abuse Prescription Drugs                           [4]=                           [4]  = 0  Alcohol                           [3] =                          [3]   =0  Illicit Drugs                           [4]=                           [4]   =0    2. Personal History of Substance Abuse Prescription Drugs                          [5]=                            [5]   =0    3. Age (If between 16 to 45)                          [1]=                           [1]   =0    4. History of Preadolescent Sexual Abuse                         [3]=                            [0]   =0    ADD, OCD, Bipolar, Schizophrenia                         [2]=                            [2]   =0    5. Psychological Disease Depression                        [1]=                             [1]   =0    TOTAL Score =   0     Last opioid risk screening date/ordered today: 1/9/2024  Patient's total score is 0    Reference :  Low Score = 0 to 3  Moderate Score = 4 to 7  High Score = =8       Pain Scale Screening:   Pain Assessment and Documentation Tool (PADT)   Date of Assessment: 1/9/2024  Analgesia:   Patient reports her pain level on average during the past week is 4on a 0 - 10 scale.   Patient reports that her pain level at its worst during the past week was 9 on a 0 -10 scale.   80% of pain has been relieved during the past week per patient   Patient states that the amount of pain relief she is now obtaining from her current pain reliever(s) is enough to make a real difference in her life.     Activities of Daily Living:   Physical functioning: better  Family relationships: unchanged  Social relationships: unchanged  Mood: better  Sleep patterns: better  Overall functioning: better  Adverse Events: No Talib Washington is not experiencing side effects from current pain reliever.  Patients overall severity of side effect:none  Specific Analgesic Plan: Continue present regimen.

## 2024-01-09 NOTE — PROGRESS NOTES
Subjective   Patient ID: Talib Washington is a 89 y.o. male who presents for Follow-up (Post procedure follow up).    Talib is a pleasant 89-year-old  male who is here for postprocedure follow-up.  Patient is ambulatory.  Gait is steady.  He arrives with his significant other.    Patient had bilateral L4-L5, L5-S1 diagnostic medial branch block #2 using Marcaine on 11/13/2023.  The injection has improved his back pain.  It provided 80% relief that lasted for 2 weeks.  He reports that he had almost 100% relief for the first several days.  The injection has improved his pain, sleep, ability to participate in his daily activities and ability to enjoy social activities.    Patient continues to have chronic lower back pain.  He rates his pain as 3 out of 10 with rest.  Pain increases with activities.  It can go up to 6-8 out of 10.  Pain can be constant or comes and goes depending on his activities.  He describes it as tender and tightness kind of pain.  Back pain is aggravated with prolonged standing or walking or certain household chores.  He reports that he was picking up object and he has hard time getting up as his back was hurting.  He also mentioned that bending hurts his back more.  He denies pain, numbness or tingling sensation to his legs.  He denies leg weakness or change in balance.  He denies bowel or bladder incontinence.  He denies recent falls, injuries, or ER visits.  There has been no change since he was last seen.    Patient continues to take gabapentin 100 mg at bedtime however he ran out of this prescription.  He denies side effects from his medication.    I discussed the plan of care including pharmacologic and joint interventional procedure.  I discussed proceeding to the lumbar RFA and he is in agreement.  This is done under fluoroscopy.  Questions were answered during this encounter.    -------------  11/13/2023: Bilateral L4-L5, L5-S1 MBB #2  ---------        Past Medical History  He  has a past medical history of Hypertension, Personal history of other diseases of the musculoskeletal system and connective tissue (10/13/2015), Personal history of other endocrine, nutritional and metabolic disease (10/13/2015), Personal history of other malignant neoplasm of large intestine (10/13/2015), and Personal history of urinary (tract) infections (04/20/2017).    Surgical History  Past Surgical History:   Procedure Laterality Date    TOTAL HIP ARTHROPLASTY  10/13/2015    Total Hip Replacement        Social History  He reports that he has never smoked. He has never used smokeless tobacco. He reports current alcohol use of about 2.0 standard drinks of alcohol per week. He reports that he does not use drugs.    Family History  Family History   Problem Relation Name Age of Onset    Diabetes Mother          Allergies  Patient has no known allergies.      Current Outpatient Medications:     allopurinol (Zyloprim) 300 mg tablet, Take 1 tablet (300 mg) by mouth once daily., Disp: , Rfl:     aspirin 81 mg EC tablet, Take 1 tablet (81 mg) by mouth once daily., Disp: , Rfl:     lisinopriL-hydrochlorothiazide 20-12.5 mg tablet, Take 1 tablet by mouth once daily., Disp: , Rfl:     lovastatin (Mevacor) 40 mg tablet, Take 1 tablet (40 mg) by mouth once daily at bedtime., Disp: , Rfl:     tamsulosin (Flomax) 0.4 mg 24 hr capsule, Take 1 capsule (0.4 mg) by mouth once daily., Disp: , Rfl:     gabapentin (Neurontin) 100 mg capsule, Take 1 capsule (100 mg) by mouth once daily at bedtime., Disp: 90 capsule, Rfl: 2     Review of Systems   Constitutional: Negative.    HENT: Negative.     Eyes: Negative.    Respiratory: Negative.     Cardiovascular: Negative.    Gastrointestinal: Negative.    Endocrine: Negative.    Genitourinary: Negative.    Musculoskeletal:  Positive for arthralgias, back pain and myalgias. Negative for gait problem, joint swelling, neck pain and neck stiffness.   Skin: Negative.    Allergic/Immunologic:  Negative.    Neurological: Negative.    Psychiatric/Behavioral: Negative.          Physical Exam  Vitals and nursing note reviewed.   HENT:      Head: Normocephalic.      Nose: Nose normal.   Eyes:      Extraocular Movements: Extraocular movements intact.      Conjunctiva/sclera: Conjunctivae normal.      Pupils: Pupils are equal, round, and reactive to light.   Cardiovascular:      Rate and Rhythm: Normal rate and regular rhythm.   Pulmonary:      Effort: Pulmonary effort is normal.      Breath sounds: Normal breath sounds.   Musculoskeletal:         General: Tenderness present. No swelling, deformity or signs of injury.      Cervical back: No rigidity or tenderness.      Lumbar back: Tenderness present.      Right lower leg: No edema.      Left lower leg: No edema.      Comments: Negative leg raise.  Positive for minimal paraspinal tenderness at the lumbar region bilaterally at L4-L5, L5-S1 with rotation.  No radicular symptoms.  BUE 4/5, BLE 4/5.   Skin:     General: Skin is warm and dry.   Neurological:      General: No focal deficit present.      Mental Status: He is alert and oriented to person, place, and time.   Psychiatric:         Mood and Affect: Mood normal.         Behavior: Behavior normal.          Last Recorded Vitals  /74   Pulse 103   Temp 36.6 °C (97.8 °F) (Temporal)   Wt 119 kg (262 lb)     Relevant Results      Pain Management Panel  More data exists         Latest Ref Rng & Units 1/9/2024 9/6/2023   Pain Management Panel   Amphetamine Screen, Urine Presumptive Negative Presumptive Negative  PRESUMPTIVE NEGATIVE    Barbiturate Screen, Urine Presumptive Negative Presumptive Negative  PRESUMPTIVE NEGATIVE    Benzodiazepines Screen, Urine Presumptive Negative Presumptive Negative  -   Fentanyl Screen, Urine Presumptive Negative Presumptive Negative  PRESUMPTIVE NEGATIVE            Media Information              Narrative & Impression   MRN: [id number]  Patient Name: SHABANA VILLEGAS      STUDY:  SPINE, LUMBOSACRAL  MIN 4 VIEWS;  5/25/2021 2:04 pm     INDICATION:  XRAY L-SPINE Low back pain.     COMPARISON:  06/21/2011     ACCESSION NUMBER(S):  [id number]     ORDERING CLINICIAN:  SUZANNA ESTRADA     FINDINGS:  Lumbar spine, five views     Moderate facet disease lower lumbar spine. Moderate multilevel  spondylosis worse at L5-S1. This includes disc space narrowing  osteophytosis. Vascular calcifications present. No fracture or  spondylolisthesis     IMPRESSION:  Moderate facet disease and spondylosis lower lumbar spine         Assessment/Plan   Problem List Items Addressed This Visit             ICD-10-CM    Facet arthropathy, lumbosacral - Primary M47.817    Relevant Medications    gabapentin (Neurontin) 100 mg capsule    Other Relevant Orders    Radiofrequency Ablation     Other Visit Diagnoses         Codes    Medication management     Z79.899    Relevant Orders    Drug Screen, Urine With Reflex to Confirmation (Completed)                   1. Facet arthropathy, lumbosacral  - gabapentin (Neurontin) 100 mg capsule; Take 1 capsule (100 mg) by mouth once daily at bedtime.  Dispense: 90 capsule; Refill: 2  - Radiofrequency Ablation; Future    2. Medication management  - Drug Screen, Urine With Reflex to Confirmation; Future  - Drug Screen, Urine With Reflex to Confirmation       Plan/Follow-up Instructions:     I refilled your gabapentin.  Continue taking 100 mg at bedtime.    You are scheduled for bilateral L4-L5, L5-S1 RFA in [place] with Dr. Diggs.  No aspirin this day.  If getting sedation then you must not eat or drink 6 hours before the procedure and you must have a  with you.  The office will call you for further instructions.  You will receive Cipro before the procedure due to your history of right total hip replacement.  I will then see you for your postprocedure follow-up in 8 to 12 weeks.      ----------------  Addendum 1/24/2024:    Patient's procedure is pending  approval.    Patient had his first diagnostic medial branch block bilateral L4-L5, L5-S1 on 8/7/2023.  His pain was 8 out of 10 during his office visit on 7/19/2023.  He reports 90% relief after the first injection that lasted for several days.  He mentioned his pain was 1-2 out of 10.    He had his second diagnostic MBB bilateral L4-L5, L5-S1 on 11/13/2023 where he obtain 100% relief for the first week.  He rates his pain a 0 out of 10 at that time.  His pain level during the office visit before the procedure was 6 out of 10.    Patient scheduled for bilateral L4-L5, L5-S1 RFA done under fluoroscopy pending approval.      Swapna Berry DNP, APRN, FER   --------------------    Addendum 1/31/2024:    Patient's procedure scheduled for 2/5/2024 was again denied.  I rescheduled his procedure, bilateral L4-L5, L5-S1 RFA to 2/19/2024.  Patient will be made aware of this change.      Swapna Berry DNP, APRN, FER    ----------------      Disclaimer: This note was created using voice recognition software. It was not corrected for typographical or grammatical errors, inadvertent word insertion, or any unintended errors. Please feel free to contact me for clarification.        Sawpna Berry DNP, APRN, FER   [facility]  Office #: [phone]  Fax # [phone]

## 2024-01-25 DIAGNOSIS — M47.817 FACET ARTHROPATHY, LUMBOSACRAL: Primary | ICD-10-CM

## 2024-01-25 RX ORDER — CIPROFLOXACIN 500 MG/1
500 TABLET ORAL ONCE
Status: CANCELLED | OUTPATIENT
Start: 2024-01-29

## 2024-01-25 RX ORDER — SODIUM CHLORIDE, SODIUM LACTATE, POTASSIUM CHLORIDE, CALCIUM CHLORIDE 600; 310; 30; 20 MG/100ML; MG/100ML; MG/100ML; MG/100ML
100 INJECTION, SOLUTION INTRAVENOUS CONTINUOUS
Status: CANCELLED | OUTPATIENT
Start: 2024-01-29

## 2024-01-26 ENCOUNTER — TELEPHONE (OUTPATIENT)
Dept: OPERATING ROOM | Facility: HOSPITAL | Age: 89
End: 2024-01-26
Payer: COMMERCIAL

## 2024-01-29 ENCOUNTER — APPOINTMENT (OUTPATIENT)
Dept: PAIN MEDICINE | Facility: HOSPITAL | Age: 89
End: 2024-01-29
Payer: MEDICARE

## 2024-01-30 ENCOUNTER — TELEPHONE (OUTPATIENT)
Dept: PAIN MEDICINE | Facility: HOSPITAL | Age: 89
End: 2024-01-30
Payer: COMMERCIAL

## 2024-01-31 ENCOUNTER — TELEPHONE (OUTPATIENT)
Dept: OPERATING ROOM | Facility: HOSPITAL | Age: 89
End: 2024-01-31
Payer: COMMERCIAL

## 2024-02-05 ENCOUNTER — APPOINTMENT (OUTPATIENT)
Dept: PAIN MEDICINE | Facility: HOSPITAL | Age: 89
End: 2024-02-05
Payer: MEDICARE

## 2024-02-08 DIAGNOSIS — M47.817 FACET ARTHROPATHY, LUMBOSACRAL: Primary | ICD-10-CM

## 2024-02-19 ENCOUNTER — HOSPITAL ENCOUNTER (OUTPATIENT)
Dept: RADIOLOGY | Facility: HOSPITAL | Age: 89
Discharge: HOME | End: 2024-02-19
Payer: MEDICARE

## 2024-02-19 ENCOUNTER — HOSPITAL ENCOUNTER (OUTPATIENT)
Dept: PAIN MEDICINE | Facility: HOSPITAL | Age: 89
Discharge: HOME | End: 2024-02-19
Payer: MEDICARE

## 2024-02-19 VITALS
WEIGHT: 255 LBS | BODY MASS INDEX: 37.77 KG/M2 | DIASTOLIC BLOOD PRESSURE: 56 MMHG | HEART RATE: 85 BPM | OXYGEN SATURATION: 95 % | SYSTOLIC BLOOD PRESSURE: 126 MMHG | TEMPERATURE: 97.8 F | RESPIRATION RATE: 18 BRPM | HEIGHT: 69 IN

## 2024-02-19 DIAGNOSIS — M47.817 FACET ARTHROPATHY, LUMBOSACRAL: ICD-10-CM

## 2024-02-19 PROCEDURE — 64636 DESTROY L/S FACET JNT ADDL: CPT | Mod: 50 | Performed by: ANESTHESIOLOGY

## 2024-02-19 PROCEDURE — 2500000004 HC RX 250 GENERAL PHARMACY W/ HCPCS (ALT 636 FOR OP/ED): Performed by: NURSE PRACTITIONER

## 2024-02-19 PROCEDURE — 64636 DESTROY L/S FACET JNT ADDL: CPT | Performed by: ANESTHESIOLOGY

## 2024-02-19 PROCEDURE — 64635 DESTROY LUMB/SAC FACET JNT: CPT | Mod: 50 | Performed by: ANESTHESIOLOGY

## 2024-02-19 PROCEDURE — 99152 MOD SED SAME PHYS/QHP 5/>YRS: CPT | Performed by: ANESTHESIOLOGY

## 2024-02-19 PROCEDURE — 99153 MOD SED SAME PHYS/QHP EA: CPT | Performed by: ANESTHESIOLOGY

## 2024-02-19 PROCEDURE — 64635 DESTROY LUMB/SAC FACET JNT: CPT | Performed by: ANESTHESIOLOGY

## 2024-02-19 PROCEDURE — 2500000001 HC RX 250 WO HCPCS SELF ADMINISTERED DRUGS (ALT 637 FOR MEDICARE OP): Performed by: NURSE PRACTITIONER

## 2024-02-19 PROCEDURE — 2500000004 HC RX 250 GENERAL PHARMACY W/ HCPCS (ALT 636 FOR OP/ED): Performed by: ANESTHESIOLOGY

## 2024-02-19 RX ORDER — CIPROFLOXACIN 500 MG/1
TABLET ORAL
Status: DISCONTINUED
Start: 2024-02-19 | End: 2024-02-19 | Stop reason: HOSPADM

## 2024-02-19 RX ORDER — MIDAZOLAM HYDROCHLORIDE 1 MG/ML
INJECTION, SOLUTION INTRAMUSCULAR; INTRAVENOUS AS NEEDED
Status: COMPLETED | OUTPATIENT
Start: 2024-02-19 | End: 2024-02-19

## 2024-02-19 RX ORDER — CIPROFLOXACIN 500 MG/1
500 TABLET ORAL ONCE
Status: COMPLETED | OUTPATIENT
Start: 2024-02-19 | End: 2024-02-19

## 2024-02-19 RX ORDER — SODIUM CHLORIDE, SODIUM LACTATE, POTASSIUM CHLORIDE, CALCIUM CHLORIDE 600; 310; 30; 20 MG/100ML; MG/100ML; MG/100ML; MG/100ML
100 INJECTION, SOLUTION INTRAVENOUS CONTINUOUS
Status: DISCONTINUED | OUTPATIENT
Start: 2024-02-19 | End: 2024-02-20 | Stop reason: HOSPADM

## 2024-02-19 RX ORDER — FENTANYL CITRATE 50 UG/ML
INJECTION, SOLUTION INTRAMUSCULAR; INTRAVENOUS AS NEEDED
Status: COMPLETED | OUTPATIENT
Start: 2024-02-19 | End: 2024-02-19

## 2024-02-19 RX ADMIN — MIDAZOLAM 0.5 MG: 1 INJECTION INTRAMUSCULAR; INTRAVENOUS at 12:13

## 2024-02-19 RX ADMIN — SODIUM CHLORIDE, POTASSIUM CHLORIDE, SODIUM LACTATE AND CALCIUM CHLORIDE 100 ML/HR: 600; 310; 30; 20 INJECTION, SOLUTION INTRAVENOUS at 11:40

## 2024-02-19 RX ADMIN — FENTANYL CITRATE 50 MCG: 50 INJECTION, SOLUTION INTRAMUSCULAR; INTRAVENOUS at 12:11

## 2024-02-19 RX ADMIN — FENTANYL CITRATE 50 MCG: 50 INJECTION, SOLUTION INTRAMUSCULAR; INTRAVENOUS at 12:14

## 2024-02-19 RX ADMIN — CIPROFLOXACIN 500 MG: 500 TABLET, FILM COATED ORAL at 11:40

## 2024-02-19 RX ADMIN — MIDAZOLAM 1.5 MG: 1 INJECTION INTRAMUSCULAR; INTRAVENOUS at 12:10

## 2024-02-19 ASSESSMENT — PAIN SCALES - GENERAL
PAINLEVEL_OUTOF10: 0 - NO PAIN
PAINLEVEL_OUTOF10: 2
PAINLEVEL_OUTOF10: 0 - NO PAIN
PAINLEVEL_OUTOF10: 0 - NO PAIN
PAINLEVEL_OUTOF10: 2

## 2024-02-19 ASSESSMENT — ENCOUNTER SYMPTOMS
NAUSEA: 0
WOUND: 0
FEVER: 0
MUSCULOSKELETAL NEGATIVE: 1
DIARRHEA: 0
CHILLS: 0
SHORTNESS OF BREATH: 0
FATIGUE: 0
NEUROLOGICAL NEGATIVE: 1
DIAPHORESIS: 0
VOMITING: 0

## 2024-02-19 ASSESSMENT — PAIN DESCRIPTION - DESCRIPTORS: DESCRIPTORS: ACHING

## 2024-02-19 ASSESSMENT — PAIN - FUNCTIONAL ASSESSMENT
PAIN_FUNCTIONAL_ASSESSMENT: 0-10

## 2024-02-19 NOTE — DISCHARGE INSTRUCTIONS
Discharge Instructions:   ° Keep Band-Aid on for the next 24 hours.    ° No showering/bathing for the next 24 hours.    ° You may notice soreness or increased pain in the area of your injection, which may continue for the first 48 hours.    ° Avoid driving or operating any heavy machinery until the next day after the procedure.  ° You should resume any medications and your regular diet after the procedure.  ° You may resume regular daily activity but should avoid strenuous activity the day of the procedure.  Some of the side affects you may experience from the steroids are:  ° Insomnia (inability to sleep)  ° Increased sweating  ° Headaches  ° Increased fluid retention (swelling of your extremities)  ° Increase appetite  ° Face flushing  ° If you are a diabetic, your blood sugars may go up.  Closely monitor your diet.  Your blood sugar should return to normal in a few days.  Complications:   ° Complications are rare with the most common being temporary increase pain near the injection site. You can apply ice to affected area on the day of the procedure.   ° If the discomfort persists, apply moist heat to the area. Serious complications are very uncommon but may include bleeding, infection or nerve damage.   ° If severe pain, fever, redness or swelling near the injection site, have someone take you to the nearest emergency room to be evaluated for procedure complications or infection.  Expectations:   ° Local anesthetics wear off in several hours but duration of relief varies from individual to individual.     If you have any questions, please call the office at [phone].    If this is an emergency, call [phone] or go to your nearest hospital.

## 2024-02-19 NOTE — H&P
History Of Present Illness  Talib Washington is a 89 y.o. male presenting with facet arthropathy lumbosacral. Last seen by Swapna Berry NP on 1/9 after his last facet injections on 11/13. It took insurance a while to schedule him for his RFA. States he has been doing well. Is receiving sedation. Did not take his baby aspirin this morning. Denies fever, chills, SOB, CP, n/v/d.      Past Medical History  Past Medical History:   Diagnosis Date    Hypertension     Personal history of other diseases of the musculoskeletal system and connective tissue 10/13/2015    History of gout    Personal history of other endocrine, nutritional and metabolic disease 10/13/2015    History of hyperlipidemia    Personal history of other malignant neoplasm of large intestine 10/13/2015    History of malignant neoplasm of colon    Personal history of urinary (tract) infections 04/20/2017    History of urinary tract infection       Surgical History  Past Surgical History:   Procedure Laterality Date    TOTAL HIP ARTHROPLASTY  10/13/2015    Total Hip Replacement        Social History  He reports that he has never smoked. He has never used smokeless tobacco. He reports current alcohol use of about 2.0 standard drinks of alcohol per week. He reports that he does not use drugs.    Family History  Family History   Problem Relation Name Age of Onset    Diabetes Mother          Allergies  Patient has no known allergies.    Review of Systems   Constitutional:  Negative for chills, diaphoresis, fatigue and fever.   HENT: Negative.     Respiratory:  Negative for shortness of breath.    Cardiovascular:  Negative for chest pain.   Gastrointestinal:  Negative for diarrhea, nausea and vomiting.   Genitourinary: Negative.    Musculoskeletal: Negative.    Skin:  Negative for pallor, rash and wound.   Neurological: Negative.         Physical Exam  Constitutional:       General: He is not in acute distress.     Appearance: Normal appearance.   HENT:      Head:  "Normocephalic.      Mouth/Throat:      Mouth: Mucous membranes are moist.   Eyes:      General: No scleral icterus.     Conjunctiva/sclera: Conjunctivae normal.      Pupils: Pupils are equal, round, and reactive to light.   Cardiovascular:      Rate and Rhythm: Normal rate and regular rhythm.      Pulses: Normal pulses.      Heart sounds: Normal heart sounds.   Pulmonary:      Effort: Pulmonary effort is normal. No respiratory distress.      Breath sounds: Normal breath sounds.   Abdominal:      Tenderness: There is no rebound.   Musculoskeletal:         General: Normal range of motion.   Skin:     General: Skin is warm and dry.   Neurological:      General: No focal deficit present.      Mental Status: He is alert and oriented to person, place, and time. Mental status is at baseline.   Psychiatric:         Mood and Affect: Mood normal.          Last Recorded Vitals  Blood pressure 140/72, pulse 100, temperature 36.8 °C (98.2 °F), temperature source Temporal, resp. rate 17, height 1.753 m (5' 9\"), weight 116 kg (255 lb), SpO2 99 %.       Assessment/Plan   Facet arthropathy lumbosacral    B/l L4-L5, L5-S1 RFA with sedation       I spent 25 minutes in the professional and overall care of this patient.      Marielos Palomino PA-C    "

## 2024-02-19 NOTE — OP NOTE
Date: 2024  OR Location: [facility] NON-OR PROCEDURES    Name: Talib Washington, : 3/25/1934, Age: 89 y.o., MRN: [id number], Sex: male    Diagnosis   1. Facet arthropathy, lumbosacral  Radiofrequency Ablation    Radiofrequency Ablation        Preoperative Diagnosis: Lumbar spondylosis without myelopathy L4-5 L5-S1    Operation Performed:   1. Bilateral Percutaneous localization of medial branch nerves of L4-5 L5-S1 with fluoroscopy/neural stimulation.    2. Lumbar radiofrequency ablation of the above-mentioned medial branch nerves.      Procedures     Medical necessity was determined by the above-diagnosis.     Anesthesia: IV conscious sedation    Estimated Blood Loss: none    Complications: none    CLINICAL NOTE: This patient was admitted to the pain center to undergo bilateral radiofrequency ablation of the lumbar medial branch nerves L4-5 L5-S1 the patient has had a history of severe low back pain for the past 12 months. The pain is axial in nature, involving the buttocks and posterior thigh. Prior to being taken to the operating room, all risks and potential outcomes, side effects and complications to include dural puncture, nerve damage, bleeding, increased pain, and infection, were re-explained to the patient, along with the rationale to perform the procedure. The patient voluntarily signed a consent form and no guarantees were made.      PROCEDURE: The 89 y.o. male patient was taken to the operating room and placed in the prone position with proper monitoring of vital signs. Monitored anesthesia care was administered so that the patient could interact with me during the procedure.  IV conscious sedation was used to allay anxiety. The patient was prepped and draped in the usual sterile fashion. This skin and subcutaneous tissue were anesthetized with a 25-gauge 1-1/4 inch needle using 14 cc's of 1% lidocaine. Using multiplanar fluoroscopy guidance, the medial branch nerves (MBN's) were individually  needle-localized at L4-5 L5-S1 with a 18-gauge angulated [employer] 10 mm exposed tip venom needle. I correctly placed the needle tip at the junction of the superior articular process and the transverse process, where the mammilloaccessory ligament is located. Each RFA was performed with the exposed needle tip safely over and contacting bony margins. To further clarify and localize the anatomical targets, neurosensory stimulation of the medial branch nerves was carried out between 50 and 75 cycles per second. I detected paraspinal muscle twitching without radicular or motor responses when stimulating for motor nerves at 2 cps up to 2 V. Appropriate sensory responses without radicular motor response confirmed the needle tip was away from respective nerve root elements. After this was confirmed at each individual level being treated, 1 mL of 2% lidocaine was injected; Then each of the 4 medial branch nerves was individually ablated at 85°C for 60 seconds. The needles were then rotated 90° and a second RF a was performed at 85° for 60 seconds. This was followed by injecting 20 mg of Depo-Medrol +1 mL of 2% lidocaine. This was performed and both levels post ablation to overt the potential for postoperative neuritis. The needles were then removed and sterile bandages with Neosporin ointment were applied to the puncture sites. A total of 4 sites were ablated with radiofrequency ablation.    The patient tolerated the procedure well and remained both hemodynamically stable and neurologically intact and was transferred to the recovery room in stable condition. The patient was discharged with instructions to follow-up in the clinic in 4-6 weeks.

## 2024-02-21 ENCOUNTER — PROCEDURE VISIT (OUTPATIENT)
Dept: PODIATRY | Facility: CLINIC | Age: 89
End: 2024-02-21
Payer: MEDICARE

## 2024-02-21 DIAGNOSIS — M79.671 FOOT PAIN, BILATERAL: ICD-10-CM

## 2024-02-21 DIAGNOSIS — I73.9 PVD (PERIPHERAL VASCULAR DISEASE) (CMS-HCC): Primary | ICD-10-CM

## 2024-02-21 DIAGNOSIS — M79.672 FOOT PAIN, BILATERAL: ICD-10-CM

## 2024-02-21 DIAGNOSIS — B35.1 MYCOTIC TOENAILS: ICD-10-CM

## 2024-02-21 PROCEDURE — 99212 OFFICE O/P EST SF 10 MIN: CPT | Performed by: PODIATRIST

## 2024-02-21 NOTE — PROGRESS NOTES
History of Present Illness:   Patient states they are here for foot exam  Denies NTB to feet  Unable to safely trim nails on own    Past Medical History  Past Medical History:   Diagnosis Date    Personal history of other diseases of the musculoskeletal system and connective tissue 10/13/2015    History of gout    Personal history of other endocrine, nutritional and metabolic disease 10/13/2015    History of hyperlipidemia    Personal history of other malignant neoplasm of large intestine 10/13/2015    History of malignant neoplasm of colon    Personal history of urinary (tract) infections 04/20/2017    History of urinary tract infection       Medications and Allergies have been reviewed.    Review Of Systems:  GENERAL: No weight loss, malaise or fevers.  HEENT: Negative for frequent or significant headaches,   RESPIRATORY: Negative for cough, wheezing or shortness of breath.  CARDIOVASCULAR: Negative for chest pain, leg swelling or palpitations.    Examination of Both Lower Extremities:   Objective:   Vasc: DP and PT pulses are palpable bilateral.  CFT is less than 3 seconds bilateral.  Skin temperature is warm to cool proximal to distal bilateral.  Varicosities noted. NO hair growth noted    Neuro: Vibratory, light touch and proprioception are intact bilateral.      Derm: Nails 1-5 bilateral are intact thick and discolored.  Skin is supple with normal texture and turgor noted.  Webspaces are clean, dry and intact bilateral.  There are no hyperkeratoses, ulcerations, verruca or other lesions noted.      Ortho: Muscle strength is 5/5 for all pedal groups tested.      1. PVD (peripheral vascular disease) (CMS/HCC)        2. Mycotic toenails        3. Foot pain, bilateral              Patient educated on proper  foot care.  Nails 1-5 b/l were debrided in thickness and length with nail cutting forceps.  Ok to keep trimmed and filed down.   No SOI noted. Mild risk noted due to PVD and varicosities  Patient to follow up  in 6 mos or sooner if any problems arise.   Patient was in agreement to this plan. All questions answered.      Kika Wan DPM  [phone]  [facility] [address]  Fax: [phone]

## 2024-03-25 PROCEDURE — 88305 TISSUE EXAM BY PATHOLOGIST: CPT | Performed by: PATHOLOGY

## 2024-03-25 PROCEDURE — 88305 TISSUE EXAM BY PATHOLOGIST: CPT

## 2024-03-27 ENCOUNTER — LAB REQUISITION (OUTPATIENT)
Dept: LAB | Facility: HOSPITAL | Age: 89
End: 2024-03-27
Payer: MEDICARE

## 2024-03-27 DIAGNOSIS — D22.30 MELANOCYTIC NEVI OF UNSPECIFIED PART OF FACE: ICD-10-CM

## 2024-04-03 LAB
LABORATORY COMMENT REPORT: NORMAL
PATH REPORT.FINAL DX SPEC: NORMAL
PATH REPORT.GROSS SPEC: NORMAL
PATH REPORT.RELEVANT HX SPEC: NORMAL
PATH REPORT.TOTAL CANCER: NORMAL

## 2024-04-10 PROCEDURE — 88305 TISSUE EXAM BY PATHOLOGIST: CPT

## 2024-04-10 PROCEDURE — 88305 TISSUE EXAM BY PATHOLOGIST: CPT | Performed by: PATHOLOGY

## 2024-04-10 PROCEDURE — 0753T DGTZ GLS MCRSCP SLD LEVEL IV: CPT

## 2024-04-11 ENCOUNTER — LAB REQUISITION (OUTPATIENT)
Dept: LAB | Facility: HOSPITAL | Age: 89
End: 2024-04-11
Payer: MEDICARE

## 2024-04-11 DIAGNOSIS — C44.222 SQUAMOUS CELL CARCINOMA OF SKIN OF RIGHT EAR AND EXTERNAL AURICULAR CANAL: ICD-10-CM

## 2024-04-23 ENCOUNTER — OFFICE VISIT (OUTPATIENT)
Dept: PAIN MEDICINE | Facility: HOSPITAL | Age: 89
End: 2024-04-23
Payer: MEDICARE

## 2024-04-23 VITALS
HEIGHT: 69 IN | HEART RATE: 88 BPM | BODY MASS INDEX: 37.18 KG/M2 | WEIGHT: 251 LBS | TEMPERATURE: 98 F | DIASTOLIC BLOOD PRESSURE: 60 MMHG | SYSTOLIC BLOOD PRESSURE: 135 MMHG | RESPIRATION RATE: 14 BRPM

## 2024-04-23 DIAGNOSIS — M47.817 FACET ARTHROPATHY, LUMBOSACRAL: Primary | ICD-10-CM

## 2024-04-23 DIAGNOSIS — Z79.899 MEDICATION MANAGEMENT: ICD-10-CM

## 2024-04-23 LAB
AMPHETAMINES UR QL SCN: NORMAL
BARBITURATES UR QL SCN: NORMAL
BZE UR QL SCN: NORMAL
CANNABINOIDS UR QL SCN: NORMAL
CREAT UR-MCNC: 168 MG/DL (ref 20–370)
PCP UR QL SCN: NORMAL

## 2024-04-23 PROCEDURE — 80355 GABAPENTIN NON-BLOOD: CPT | Performed by: NURSE PRACTITIONER

## 2024-04-23 PROCEDURE — 1159F MED LIST DOCD IN RCRD: CPT | Performed by: NURSE PRACTITIONER

## 2024-04-23 PROCEDURE — 3078F DIAST BP <80 MM HG: CPT | Performed by: NURSE PRACTITIONER

## 2024-04-23 PROCEDURE — 99213 OFFICE O/P EST LOW 20 MIN: CPT | Performed by: NURSE PRACTITIONER

## 2024-04-23 PROCEDURE — 80307 DRUG TEST PRSMV CHEM ANLYZR: CPT | Mod: 91,MUE | Performed by: NURSE PRACTITIONER

## 2024-04-23 PROCEDURE — 1160F RVW MEDS BY RX/DR IN RCRD: CPT | Performed by: NURSE PRACTITIONER

## 2024-04-23 PROCEDURE — 99213 OFFICE O/P EST LOW 20 MIN: CPT | Mod: ZK | Performed by: NURSE PRACTITIONER

## 2024-04-23 PROCEDURE — 1125F AMNT PAIN NOTED PAIN PRSNT: CPT | Performed by: NURSE PRACTITIONER

## 2024-04-23 PROCEDURE — 80346 BENZODIAZEPINES1-12: CPT | Mod: 91,MUE | Performed by: NURSE PRACTITIONER

## 2024-04-23 PROCEDURE — 3075F SYST BP GE 130 - 139MM HG: CPT | Performed by: NURSE PRACTITIONER

## 2024-04-23 PROCEDURE — 1036F TOBACCO NON-USER: CPT | Performed by: NURSE PRACTITIONER

## 2024-04-23 RX ORDER — MELOXICAM 7.5 MG/1
7.5 TABLET ORAL DAILY
COMMUNITY
Start: 2024-04-17 | End: 2024-04-23

## 2024-04-23 ASSESSMENT — PAIN SCALES - GENERAL: PAINLEVEL: 4

## 2024-04-23 NOTE — PROGRESS NOTES
I have personally reviewed the [place]AR[place] report for Talib Washington     Date of the last Controlled Substance Agreement: 1/9/2024       Last urine drug screening date/ordered today: 04/23/24     Results of last screen: Results as expected.       Last opioid risk screening date/ordered today: 1/9/2024      Pain Scale Screening:   Pain Assessment and Documentation Tool (PADT)   Date of Assessment:4/23/2024  Analgesia:   Patient reports her pain level on average during the past week is 4on a 0 - 10 scale.   Patient reports that her pain level at its worst during the past week was 6 on a 0 -10 scale.   80%  of pain has been relieved during the past week per patient   Patient states that the amount of pain relief she is now obtaining from her current pain reliever(s) is enough to make a real difference in her life.   Query to clinician: Is the patient's pain relief clinically significant? yes  Activities of Daily Living:   Physical functioning: better  Family relationships: better  Social relationships: better  Mood: better  Sleep patterns: better  Overall functioning: better  Adverse Events: No, Talib Washington is not experiencing side effects from current pain reliever.  Patients overall severity of side effect:none  Specific Analgesic Plan: Continue present regimen.

## 2024-04-23 NOTE — PROGRESS NOTES
Subjective   Talib Washington is a pleasant 89 y.o. male who is here for postprocedure follow-up.  Patient is ambulatory.  Gait is steady.  He arrives with his significant other.    Patient had bilateral L4-L5, L5-S1 RFA on 2/19/2024.  The procedure has improved his back pain.  It provided 80% relief.  He is still feeling better.  The procedure is to improve his pain and his ability to participate in his daily activities.    Patient currently has no back pain.  He reports pain comes and goes depending on his activities and at that time he will describes it as aching.  He reports that he can stand longer while in the kitchen.  He has some pain only with activities.  He denies pain, numbness or tingling sensation to his legs.  He denies leg weakness or change in balance.  He denies bowel or bladder incontinence.  He denies recent falls, injuries, or ER visits.  There has been no changes since he was last seen.    Patient continues to take gabapentin 100 mg at night and denies side effects from that medication.  I discussed plan of care.  I will see him for his follow-up visit.  Questions were answered during this encounter.    ---------------  2/19/2024: Bilateral L4-L5, L5-S1 RFA  -------------      OARRS:  PATY Weiss-PURNIMA, DNP on 4/23/2024  2:40 PM  I have personally reviewed the OARRS report for Talib Washington. I have considered the risks of abuse, dependence, addiction and diversion    ROS: No changes except for what was noted in HPI.      /60   Pulse 88   Temp 36.7 °C (98 °F) (Temporal)   Resp 14   Wt 114 kg (251 lb)     Objective   Physical Exam  Vitals and nursing note reviewed.   HENT:      Head: Normocephalic.      Nose: Nose normal.   Eyes:      Extraocular Movements: Extraocular movements intact.      Conjunctiva/sclera: Conjunctivae normal.      Pupils: Pupils are equal, round, and reactive to light.   Cardiovascular:      Rate and Rhythm: Normal rate and regular rhythm.   Pulmonary:       Effort: Pulmonary effort is normal.      Breath sounds: Normal breath sounds.   Musculoskeletal:         General: Tenderness present. No swelling, deformity or signs of injury.      Cervical back: No rigidity or tenderness.      Lumbar back: Tenderness present.      Right lower leg: No edema.      Left lower leg: No edema.      Comments: Negative leg raise.  Negative for paraspinal tenderness of the lumbar region.  No radicular symptoms.  BUE 4/5, BLE 4/5.   Skin:     General: Skin is warm and dry.   Neurological:      General: No focal deficit present.      Mental Status: He is alert and oriented to person, place, and time.   Psychiatric:         Mood and Affect: Mood normal.         Behavior: Behavior normal.            Pain Management Panel  More data exists         Latest Ref Rng & Units 1/9/2024 9/6/2023   Pain Management Panel   Amphetamine Screen, Urine Presumptive Negative Presumptive Negative  PRESUMPTIVE NEGATIVE    Barbiturate Screen, Urine Presumptive Negative Presumptive Negative  PRESUMPTIVE NEGATIVE    Benzodiazepines Screen, Urine Presumptive Negative Presumptive Negative  -   Fentanyl Screen, Urine Presumptive Negative Presumptive Negative  PRESUMPTIVE NEGATIVE           Assessment/Plan   Problem List Items Addressed This Visit             ICD-10-CM    Facet arthropathy, lumbosacral - Primary M47.817     Other Visit Diagnoses         Codes    Medication management     Z79.899    Relevant Orders    Opiate/Opioid/Benzo Prescription Compliance    Gabapentin,Urine    OOB Internal Tracking                Plan/Follow-up Instructions:    Continue taking gabapentin 100 mg at night.    I will see you for your follow-up visit in 6 months.  Please call the office if needing sooner appointment      Disclaimer: This note was created using voice recognition software. It was not corrected for typographical or grammatical errors, inadvertent word insertion, or any unintended errors. Please feel free to contact me  for clarification.      Swpana Berry, ERIN, APRN, FNP-C   [facility]  Office #: [phone]  Fax # [phone]

## 2024-04-23 NOTE — PATIENT INSTRUCTIONS
Continue taking gabapentin 100 mg at night.    I will see you for your follow-up visit in 6 months.  Please call the office if needing sooner appointment

## 2024-05-15 ENCOUNTER — PROCEDURE VISIT (OUTPATIENT)
Dept: PODIATRY | Facility: CLINIC | Age: 89
End: 2024-05-15
Payer: MEDICARE

## 2024-05-15 DIAGNOSIS — I73.9 PVD (PERIPHERAL VASCULAR DISEASE) (CMS-HCC): Primary | ICD-10-CM

## 2024-05-15 DIAGNOSIS — M79.671 FOOT PAIN, BILATERAL: ICD-10-CM

## 2024-05-15 DIAGNOSIS — B35.1 MYCOTIC TOENAILS: ICD-10-CM

## 2024-05-15 DIAGNOSIS — M79.672 FOOT PAIN, BILATERAL: ICD-10-CM

## 2024-05-15 PROCEDURE — 11721 DEBRIDE NAIL 6 OR MORE: CPT | Performed by: PODIATRIST

## 2024-05-15 NOTE — PROGRESS NOTES
History of Present Illness:   Patient states they are here for foot exam  Denies NTB to feet  Unable to safely trim nails on own    Past Medical History  Past Medical History:   Diagnosis Date    Personal history of other diseases of the musculoskeletal system and connective tissue 10/13/2015    History of gout    Personal history of other endocrine, nutritional and metabolic disease 10/13/2015    History of hyperlipidemia    Personal history of other malignant neoplasm of large intestine 10/13/2015    History of malignant neoplasm of colon    Personal history of urinary (tract) infections 04/20/2017    History of urinary tract infection       Medications and Allergies have been reviewed.    Review Of Systems:  GENERAL: No weight loss, malaise or fevers.  HEENT: Negative for frequent or significant headaches,   RESPIRATORY: Negative for cough, wheezing or shortness of breath.  CARDIOVASCULAR: Negative for chest pain, leg swelling or palpitations.    Examination of Both Lower Extremities:   Objective:   Vasc: DP and PT pulses are palpable bilateral.  CFT is less than 3 seconds bilateral.  Skin temperature is warm to cool proximal to distal bilateral.  Varicosities noted. NO hair growth noted    Neuro: Vibratory, light touch and proprioception are intact bilateral.      Derm: Nails 1-5 bilateral are intact thick and discolored.  Skin is supple with normal texture and turgor noted.  Webspaces are clean, dry and intact bilateral.  There are no hyperkeratoses, ulcerations, verruca or other lesions noted.      Ortho: Muscle strength is 5/5 for all pedal groups tested.      1. PVD (peripheral vascular disease) (CMS/HCC)        2. Mycotic toenails        3. Foot pain, bilateral          Patient educated on proper  foot care.  Nails 1-5 b/l were debrided in thickness and length with nail cutting forceps.  Ok to keep trimmed and filed down.   No SOI noted. Mild risk noted due to PVD and varicosities  Patient to follow up in 6  mos or sooner if any problems arise.   Patient was in agreement to this plan. All questions answered.      Kika Wan DPM  [phone]  [facility]  Fax: [phone]

## 2024-05-28 ENCOUNTER — HOSPITAL ENCOUNTER (OUTPATIENT)
Dept: CARDIOLOGY | Facility: HOSPITAL | Age: 89
Discharge: HOME | End: 2024-05-28
Payer: MEDICARE

## 2024-05-28 DIAGNOSIS — R55 SYNCOPE: ICD-10-CM

## 2024-05-28 LAB
ATRIAL RATE: 89 BPM
P AXIS: 29 DEGREES
P OFFSET: 156 MS
P ONSET: 93 MS
PR INTERVAL: 262 MS
Q ONSET: 224 MS
QRS COUNT: 15 BEATS
QRS DURATION: 88 MS
QT INTERVAL: 360 MS
QTC CALCULATION(BAZETT): 438 MS
QTC FREDERICIA: 410 MS
R AXIS: 45 DEGREES
T AXIS: 17 DEGREES
T OFFSET: 404 MS
VENTRICULAR RATE: 89 BPM

## 2024-05-28 PROCEDURE — 93005 ELECTROCARDIOGRAM TRACING: CPT

## 2024-06-10 ENCOUNTER — TELEMEDICINE (OUTPATIENT)
Dept: CARDIOLOGY | Facility: CLINIC | Age: 89
End: 2024-06-10
Payer: MEDICARE

## 2024-06-10 DIAGNOSIS — E78.5 DYSLIPIDEMIA: ICD-10-CM

## 2024-06-10 DIAGNOSIS — R55 SYNCOPE AND COLLAPSE: Primary | ICD-10-CM

## 2024-06-10 DIAGNOSIS — I10 ESSENTIAL HYPERTENSION, BENIGN: ICD-10-CM

## 2024-06-10 PROCEDURE — 99205 OFFICE O/P NEW HI 60 MIN: CPT | Performed by: INTERNAL MEDICINE

## 2024-06-10 NOTE — PROGRESS NOTES
THIS IS A TELE VISIT.     Referred by Dr. Jaydon Cooper for Syncope, Abnormal ECG    Dear Dr Cooper:    History Of Present Illness:    Talib Washington is a 89 y.o. male presenting with h/o syncope.   Story narrated by daughter: Rachelle Ott  He had an episode of syncope while getting up at home last week. No injuries.  She also reports  effort intolerance over the past 6 months or so.  Denies any chest pain, palpitations or dizziness.  He has occasional swelling of the feet.  Recently daughter has given him and came for support for walking.  An abnormal ECG with inferior Q waves are noted back in 2022 and an echo done in 2020 showed normal LV systolic function with EF 60 to 65%  His atherosclerotic risk factors include hypertension and dyslipidemia    He is hard of hearing.  Lives with a lady friend for the past 10 years, after being  12 years ago. Used to smoke in the remote past. Occasionally consumes wine.     12 system review was negative, except as stated above.      Past Medical History:  He has a past medical history of Hypertension, Personal history of other diseases of the musculoskeletal system and connective tissue (10/13/2015), Personal history of other endocrine, nutritional and metabolic disease (10/13/2015), Personal history of other malignant neoplasm of large intestine (10/13/2015), and Personal history of urinary (tract) infections (04/20/2017).    Past Surgical History:  He has a past surgical history that includes Total hip arthroplasty (10/13/2015). Colon resection for CA colon.      Social History:  He reports smoking in the remote past.  Occasionally consumes wine.  No other addictions.    Family History:  Family History   Problem Relation Name Age of Onset    Diabetes Mother          Allergies:  Patient has no known allergies.    Outpatient Medications:  Current Outpatient Medications   Medication Instructions    allopurinol (ZYLOPRIM) 300 mg, oral, Daily    aspirin 81 mg EC tablet 1  tablet, oral, Daily    gabapentin (NEURONTIN) 100 mg, oral, Nightly    lisinopriL-hydrochlorothiazide 20-12.5 mg tablet 1 tablet, oral, Daily    lovastatin (MEVACOR) 40 mg, oral, Nightly    tamsulosin (FLOMAX) 0.4 mg, oral, Daily        Last Recorded Vitals:  There were no vitals filed for this visit.         Last Labs:  CBC -  Lab Results   Component Value Date    WBC 6.0 07/24/2023    HGB 13.9 07/24/2023    HCT 42.1 07/24/2023    MCV 97 07/24/2023     07/24/2023       CMP -  Lab Results   Component Value Date    CALCIUM 8.8 07/24/2023    PROT 6.5 07/24/2023    ALBUMIN 4.1 07/24/2023    AST 13 07/24/2023    ALT 12 07/24/2023    ALKPHOS 76 07/24/2023    BILITOT 1.4 (H) 07/24/2023       LIPID PANEL -   Lab Results   Component Value Date    CHOL 133 02/07/2020    TRIG 226 (H) 02/07/2020    HDL 36.6 (A) 02/07/2020    CHHDL 3.6 02/07/2020    LDLF 51 02/07/2020    VLDL 45 (H) 02/07/2020    NHDL 96 02/07/2020       RENAL FUNCTION PANEL -   Lab Results   Component Value Date    GLUCOSE 98 07/24/2023     (L) 07/24/2023    K 4.1 07/24/2023    CL 99 07/24/2023    CO2 28 07/24/2023    ANIONGAP 12 07/24/2023    BUN 20 07/24/2023    CREATININE 1.40 (H) 07/24/2023    GFRMALE 48 (A) 07/24/2023    CALCIUM 8.8 07/24/2023    ALBUMIN 4.1 07/24/2023        Lab Results   Component Value Date    BNP 40 05/27/2022    HGBA1C 5.9 (A) 07/24/2023         Assessment/Plan   DIAGNOSES: Syncope for evaluation. Exertional SOB Class II. Abnormal ECG. HTN. DLD. CKD.    I requested routine labs, echo and an event monitor. We will review with the results.    Thank you so much for the opportunity to participate in the care of this very pleasant gentleman. Please do not hesitate to contact me if I could be of any assistance in his future care.    Sincerely    Maye Brown M.D.        Katie Brown MD

## 2024-06-11 ENCOUNTER — HOSPITAL ENCOUNTER (OUTPATIENT)
Dept: CARDIOLOGY | Facility: HOSPITAL | Age: 89
Discharge: HOME | End: 2024-06-11
Payer: MEDICARE

## 2024-06-11 VITALS — DIASTOLIC BLOOD PRESSURE: 63 MMHG | SYSTOLIC BLOOD PRESSURE: 153 MMHG

## 2024-06-11 DIAGNOSIS — Z79.899 OTHER LONG TERM (CURRENT) DRUG THERAPY: ICD-10-CM

## 2024-06-11 DIAGNOSIS — E78.5 HYPERLIPIDEMIA, UNSPECIFIED HYPERLIPIDEMIA TYPE: ICD-10-CM

## 2024-06-11 DIAGNOSIS — R55 SYNCOPE AND COLLAPSE: ICD-10-CM

## 2024-06-11 DIAGNOSIS — R94.31 ABNORMAL EKG: ICD-10-CM

## 2024-06-11 DIAGNOSIS — R06.00 DYSPNEA, UNSPECIFIED TYPE: ICD-10-CM

## 2024-06-11 DIAGNOSIS — K90.89 OTHER INTESTINAL MALABSORPTION (HHS-HCC): ICD-10-CM

## 2024-06-11 LAB
AORTIC VALVE PEAK VELOCITY: 1.44 M/S
AV PEAK GRADIENT: 8.2 MMHG
AVA (PEAK VEL): 4.24 CM2
EJECTION FRACTION APICAL 4 CHAMBER: 67.7
LEFT ATRIUM VOLUME AREA LENGTH INDEX BSA: 27.9 ML/M2
LEFT VENTRICLE INTERNAL DIMENSION DIASTOLE: 3.6 CM (ref 3.5–6)
LEFT VENTRICULAR OUTFLOW TRACT DIAMETER: 2.52 CM
LV EJECTION FRACTION BIPLANE: 67 %
MITRAL VALVE E/A RATIO: 0.4
MITRAL VALVE E/E' RATIO: 7.36
RIGHT VENTRICLE FREE WALL PEAK S': 13 CM/S
RIGHT VENTRICLE PEAK SYSTOLIC PRESSURE: 29.6 MMHG
TRICUSPID ANNULAR PLANE SYSTOLIC EXCURSION: 2.5 CM

## 2024-06-11 PROCEDURE — 93306 TTE W/DOPPLER COMPLETE: CPT | Performed by: INTERNAL MEDICINE

## 2024-06-11 PROCEDURE — 93306 TTE W/DOPPLER COMPLETE: CPT

## 2024-06-11 PROCEDURE — 93246 EXT ECG>7D<15D RECORDING: CPT

## 2024-07-02 NOTE — ADDENDUM NOTE
Encounter addended by: Fernando Gonsalves RN on: 7/2/2024 3:47 PM   Actions taken: Charge Capture section accepted

## 2024-07-08 NOTE — ADDENDUM NOTE
Encounter addended by: Fernando Gonsalves RN on: 7/8/2024 8:00 AM   Actions taken: Charge Capture section accepted

## 2024-07-11 ENCOUNTER — TELEPHONE (OUTPATIENT)
Dept: CARDIOLOGY | Facility: CLINIC | Age: 89
End: 2024-07-11
Payer: COMMERCIAL

## 2024-08-19 ENCOUNTER — HOSPITAL ENCOUNTER (OUTPATIENT)
Dept: RADIOLOGY | Facility: HOSPITAL | Age: 89
Discharge: HOME | End: 2024-08-19
Payer: MEDICARE

## 2024-08-19 ENCOUNTER — LAB REQUISITION (OUTPATIENT)
Dept: LAB | Facility: HOSPITAL | Age: 89
End: 2024-08-19
Payer: MEDICARE

## 2024-08-19 DIAGNOSIS — R53.83 OTHER FATIGUE: ICD-10-CM

## 2024-08-19 DIAGNOSIS — R06.02 SOB (SHORTNESS OF BREATH): ICD-10-CM

## 2024-08-19 DIAGNOSIS — I10 ESSENTIAL (PRIMARY) HYPERTENSION: ICD-10-CM

## 2024-08-19 LAB
ALBUMIN SERPL BCP-MCNC: 3.3 G/DL (ref 3.4–5)
ALP SERPL-CCNC: 281 U/L (ref 33–136)
ALT SERPL W P-5'-P-CCNC: 28 U/L (ref 10–52)
ANION GAP SERPL CALC-SCNC: 14 MMOL/L (ref 10–20)
AST SERPL W P-5'-P-CCNC: 56 U/L (ref 9–39)
BASOPHILS # BLD AUTO: 0.02 X10*3/UL (ref 0–0.1)
BASOPHILS NFR BLD AUTO: 0.4 %
BILIRUB SERPL-MCNC: 1.9 MG/DL (ref 0–1.2)
BUN SERPL-MCNC: 30 MG/DL (ref 6–23)
CALCIUM SERPL-MCNC: 8.8 MG/DL (ref 8.6–10.3)
CHLORIDE SERPL-SCNC: 97 MMOL/L (ref 98–107)
CO2 SERPL-SCNC: 26 MMOL/L (ref 21–32)
CREAT SERPL-MCNC: 1.27 MG/DL (ref 0.5–1.3)
EGFRCR SERPLBLD CKD-EPI 2021: 54 ML/MIN/1.73M*2
EOSINOPHIL # BLD AUTO: 0.02 X10*3/UL (ref 0–0.4)
EOSINOPHIL NFR BLD AUTO: 0.4 %
ERYTHROCYTE [DISTWIDTH] IN BLOOD BY AUTOMATED COUNT: 14.7 % (ref 11.5–14.5)
GLUCOSE SERPL-MCNC: 114 MG/DL (ref 74–99)
HCT VFR BLD AUTO: 37.2 % (ref 41–52)
HGB BLD-MCNC: 12.1 G/DL (ref 13.5–17.5)
IMM GRANULOCYTES # BLD AUTO: 0.02 X10*3/UL (ref 0–0.5)
IMM GRANULOCYTES NFR BLD AUTO: 0.4 % (ref 0–0.9)
LYMPHOCYTES # BLD AUTO: 0.85 X10*3/UL (ref 0.8–3)
LYMPHOCYTES NFR BLD AUTO: 16.5 %
MCH RBC QN AUTO: 31.9 PG (ref 26–34)
MCHC RBC AUTO-ENTMCNC: 32.5 G/DL (ref 32–36)
MCV RBC AUTO: 98 FL (ref 80–100)
MONOCYTES # BLD AUTO: 0.64 X10*3/UL (ref 0.05–0.8)
MONOCYTES NFR BLD AUTO: 12.5 %
NEUTROPHILS # BLD AUTO: 3.59 X10*3/UL (ref 1.6–5.5)
NEUTROPHILS NFR BLD AUTO: 69.8 %
NRBC BLD-RTO: 0 /100 WBCS (ref 0–0)
PLATELET # BLD AUTO: 133 X10*3/UL (ref 150–450)
POTASSIUM SERPL-SCNC: 4.1 MMOL/L (ref 3.5–5.3)
PROT SERPL-MCNC: 5.9 G/DL (ref 6.4–8.2)
RBC # BLD AUTO: 3.79 X10*6/UL (ref 4.5–5.9)
SODIUM SERPL-SCNC: 133 MMOL/L (ref 136–145)
TSH SERPL-ACNC: 1.58 MIU/L (ref 0.44–3.98)
WBC # BLD AUTO: 5.1 X10*3/UL (ref 4.4–11.3)

## 2024-08-19 PROCEDURE — 84443 ASSAY THYROID STIM HORMONE: CPT

## 2024-08-19 PROCEDURE — 85025 COMPLETE CBC W/AUTO DIFF WBC: CPT

## 2024-08-19 PROCEDURE — 82306 VITAMIN D 25 HYDROXY: CPT

## 2024-08-19 PROCEDURE — 71046 X-RAY EXAM CHEST 2 VIEWS: CPT

## 2024-08-19 PROCEDURE — 71046 X-RAY EXAM CHEST 2 VIEWS: CPT | Performed by: RADIOLOGY

## 2024-08-19 PROCEDURE — 80053 COMPREHEN METABOLIC PANEL: CPT

## 2024-08-20 ENCOUNTER — LAB REQUISITION (OUTPATIENT)
Dept: LAB | Facility: HOSPITAL | Age: 89
End: 2024-08-20
Payer: MEDICARE

## 2024-08-20 DIAGNOSIS — N39.0 URINARY TRACT INFECTION, SITE NOT SPECIFIED: ICD-10-CM

## 2024-08-20 LAB
25(OH)D3 SERPL-MCNC: 45 NG/ML (ref 30–100)
APPEARANCE UR: ABNORMAL
BACTERIA #/AREA URNS AUTO: ABNORMAL /HPF
BILIRUB UR STRIP.AUTO-MCNC: NEGATIVE MG/DL
COLOR UR: ABNORMAL
GLUCOSE UR STRIP.AUTO-MCNC: NEGATIVE MG/DL
HYALINE CASTS #/AREA URNS AUTO: ABNORMAL /LPF
KETONES UR STRIP.AUTO-MCNC: NEGATIVE MG/DL
LEUKOCYTE ESTERASE UR QL STRIP.AUTO: NEGATIVE
MUCOUS THREADS #/AREA URNS AUTO: ABNORMAL /LPF
NITRITE UR QL STRIP.AUTO: NEGATIVE
PH UR STRIP.AUTO: 5 [PH]
PROT UR STRIP.AUTO-MCNC: ABNORMAL MG/DL
RBC # UR STRIP.AUTO: NEGATIVE /UL
RBC #/AREA URNS AUTO: ABNORMAL /HPF
SP GR UR STRIP.AUTO: 1.02
SQUAMOUS #/AREA URNS AUTO: ABNORMAL /HPF
TRANS CELLS #/AREA UR COMP ASSIST: ABNORMAL /HPF
UROBILINOGEN UR STRIP.AUTO-MCNC: 2 MG/DL
WBC #/AREA URNS AUTO: ABNORMAL /HPF

## 2024-08-20 PROCEDURE — 81001 URINALYSIS AUTO W/SCOPE: CPT

## 2024-09-04 ENCOUNTER — APPOINTMENT (OUTPATIENT)
Dept: PODIATRY | Facility: CLINIC | Age: 89
End: 2024-09-04
Payer: COMMERCIAL

## 2024-09-04 DIAGNOSIS — M79.672 FOOT PAIN, BILATERAL: Primary | ICD-10-CM

## 2024-09-04 DIAGNOSIS — I73.9 PVD (PERIPHERAL VASCULAR DISEASE) (CMS-HCC): ICD-10-CM

## 2024-09-04 DIAGNOSIS — B35.1 MYCOTIC TOENAILS: ICD-10-CM

## 2024-09-04 DIAGNOSIS — M79.671 FOOT PAIN, BILATERAL: Primary | ICD-10-CM

## 2024-09-04 PROCEDURE — 1160F RVW MEDS BY RX/DR IN RCRD: CPT | Performed by: PODIATRIST

## 2024-09-04 PROCEDURE — 11721 DEBRIDE NAIL 6 OR MORE: CPT | Performed by: PODIATRIST

## 2024-09-04 PROCEDURE — 1159F MED LIST DOCD IN RCRD: CPT | Performed by: PODIATRIST

## 2024-09-06 ENCOUNTER — APPOINTMENT (OUTPATIENT)
Dept: SURGERY | Facility: CLINIC | Age: 89
End: 2024-09-06
Payer: MEDICARE

## 2024-09-06 NOTE — PROGRESS NOTES
History of Present Illness:   Patient states they are here for foot exam  Denies NTB to feet  Unable to safely trim nails on own    Past Medical History  Past Medical History:   Diagnosis Date    Personal history of other diseases of the musculoskeletal system and connective tissue 10/13/2015    History of gout    Personal history of other endocrine, nutritional and metabolic disease 10/13/2015    History of hyperlipidemia    Personal history of other malignant neoplasm of large intestine 10/13/2015    History of malignant neoplasm of colon    Personal history of urinary (tract) infections 04/20/2017    History of urinary tract infection       Medications and Allergies have been reviewed.    Review Of Systems:  GENERAL: No weight loss, malaise or fevers.  HEENT: Negative for frequent or significant headaches,   RESPIRATORY: Negative for cough, wheezing or shortness of breath.  CARDIOVASCULAR: Negative for chest pain, leg swelling or palpitations.    Examination of Both Lower Extremities:   Objective:   Vasc: DP and PT pulses are palpable bilateral.  CFT is less than 3 seconds bilateral.  Skin temperature is warm to cool proximal to distal bilateral.  Varicosities noted. NO hair growth noted    Neuro: Vibratory, light touch and proprioception are intact bilateral.      Derm: Nails 1-5 bilateral are intact thick and discolored.  Skin is supple with normal texture and turgor noted.  Webspaces are clean, dry and intact bilateral.  There are no hyperkeratoses, ulcerations, verruca or other lesions noted.      Ortho: Muscle strength is 5/5 for all pedal groups tested.      1. PVD (peripheral vascular disease) (CMS/HCC)        2. Mycotic toenails        3. Foot pain, bilateral          Patient educated on proper  foot care.  Nails 1-5 b/l were debrided in thickness and length with nail cutting forceps.  Ok to keep trimmed and filed down.   No SOI noted. Mild risk noted due to PVD and varicosities  Patient to follow up in 6  mos or sooner if any problems arise.   Patient was in agreement to this plan. All questions answered.    PCP DR. Cooper 8/19/2024      Kika Wan DPM  [phone]  [facility]  Fax: [phone]

## 2024-09-13 ENCOUNTER — APPOINTMENT (OUTPATIENT)
Dept: SURGERY | Facility: CLINIC | Age: 89
End: 2024-09-13
Payer: MEDICARE

## 2024-09-13 VITALS
HEART RATE: 111 BPM | BODY MASS INDEX: 36.88 KG/M2 | TEMPERATURE: 98 F | SYSTOLIC BLOOD PRESSURE: 110 MMHG | HEIGHT: 69 IN | DIASTOLIC BLOOD PRESSURE: 55 MMHG | WEIGHT: 249 LBS

## 2024-09-13 DIAGNOSIS — Z85.038 HISTORY OF COLON CANCER: Primary | ICD-10-CM

## 2024-09-13 DIAGNOSIS — R79.89 ELEVATED LIVER FUNCTION TESTS: ICD-10-CM

## 2024-09-13 DIAGNOSIS — R63.0 ANOREXIA: ICD-10-CM

## 2024-09-13 DIAGNOSIS — R68.81 EARLY SATIETY: ICD-10-CM

## 2024-09-13 ASSESSMENT — ENCOUNTER SYMPTOMS: APPETITE CHANGE: 1

## 2024-09-13 NOTE — PATIENT INSTRUCTIONS
I reviewed your chart.  You do have some mildly elevated liver test.  Your CEA level is elevated 1 year ago.  We will repeat the CEA level and check a CT scan of the abdomen pelvis.  I want you to take 1-2 protein shakes a day.  Will contact you with the results.  You can have your testing done at [facility]

## 2024-09-13 NOTE — PROGRESS NOTES
Subjective   Patient ID: Talib Washington is a 89 y.o. male who presents for anorexia and abdominal fullness    HPI   This is a 89-year-old male who has a history of colon cancer with a colon resection in May 2014.  He had a sigmoid mass.  Since that time is doing reasonly well recently noticed some fullness in his abdomen and anorexia.  1 year ago on August 16, 2023 he had a CEA level drawn at [facility] which is 29.5 there is no obvious follow-up on this that I can detect in the chart.  He notes having some nonspecific discomfort he does have anorexia.  His last colonoscopy was November 13, 2020 by if position at [facility].    Past Medical History:   Diagnosis Date    Hypertension     Personal history of other diseases of the musculoskeletal system and connective tissue 10/13/2015    History of gout    Personal history of other endocrine, nutritional and metabolic disease 10/13/2015    History of hyperlipidemia    Personal history of other malignant neoplasm of large intestine 10/13/2015    History of malignant neoplasm of colon    Personal history of urinary (tract) infections 04/20/2017    History of urinary tract infection        Current Outpatient Medications on File Prior to Visit   Medication Sig Dispense Refill    allopurinol (Zyloprim) 300 mg tablet Take 1 tablet (300 mg) by mouth once daily.      aspirin 81 mg EC tablet Take 1 tablet (81 mg) by mouth once daily.      gabapentin (Neurontin) 100 mg capsule Take 1 capsule (100 mg) by mouth once daily at bedtime. 90 capsule 2    lisinopriL-hydrochlorothiazide 20-12.5 mg tablet Take 1 tablet by mouth once daily.      lovastatin (Mevacor) 40 mg tablet Take 1 tablet (40 mg) by mouth once daily at bedtime.      tamsulosin (Flomax) 0.4 mg 24 hr capsule Take 1 capsule (0.4 mg) by mouth once daily.       No current facility-administered medications on file prior to visit.        Review of Systems   Constitutional:  Positive for appetite change.       Vitals:    09/13/24 1351    BP: 110/55   Pulse: (!) 111   Temp: 36.7 °C (98 °F)        Objective     Physical Exam  Vitals reviewed.   Constitutional:       Appearance: He is obese.   HENT:      Head: Normocephalic.   Cardiovascular:      Rate and Rhythm: Normal rate and regular rhythm.      Heart sounds: Normal heart sounds.   Pulmonary:      Effort: Pulmonary effort is normal.      Breath sounds: Normal breath sounds.   Abdominal:      General: Abdomen is flat and protuberant. There is distension.      Palpations: Abdomen is soft. There is no mass.      Tenderness: There is no abdominal tenderness. There is no guarding.   Musculoskeletal:         General: Normal range of motion.      Cervical back: Normal range of motion.   Skin:     General: Skin is warm.   Neurological:      General: No focal deficit present.      Comments: Has a cane   Psychiatric:         Mood and Affect: Mood normal.         Relevant results   Contains abnormal data CEA BLD  Order: [id number]  Component  Ref Range & Units 1 yr ago   CEA  0.0 - 4.0 ng/mL 29.5 High    Liver function tests  Albumin  3.4 - 5.0 g/dL 3.3 Low  4.1   3.9  4.0   Alkaline Phosphatase  33 - 136 U/L 281 High  76   51  70   Total Protein  6.4 - 8.2 g/dL 5.9 Low  6.5   6.3 Low   6.7   AST  9 - 39 U/L 56 High  13   13  13   Bilirubin, Total  0.0 - 1.2 mg/dL 1.9 High  1.4 High             Problem List Items Addressed This Visit       History of colon cancer - Primary    Anorexia    Early satiety    Elevated liver function tests        Assessment/Plan   Repeat CEA level.  Check CT scan of the abdomen pelvis.  Recommend high-protein diet.  Take 1-2 protein shakes per day.  We will call you with the result.  As discussed if there is a possible recurrence of her cancer then we should consider treating you conservatively    Thanh Felix MD

## 2024-09-18 ENCOUNTER — LAB REQUISITION (OUTPATIENT)
Dept: LAB | Facility: HOSPITAL | Age: 89
End: 2024-09-18
Payer: MEDICARE

## 2024-09-18 DIAGNOSIS — N39.0 URINARY TRACT INFECTION, SITE NOT SPECIFIED: ICD-10-CM

## 2024-09-18 LAB
APPEARANCE UR: ABNORMAL
BILIRUB UR STRIP.AUTO-MCNC: NEGATIVE MG/DL
CA PHOS CRY #/AREA UR COMP ASSIST: ABNORMAL /HPF
COLOR UR: YELLOW
GLUCOSE UR STRIP.AUTO-MCNC: NEGATIVE MG/DL
GRAN CASTS #/AREA UR COMP ASSIST: ABNORMAL /LPF
KETONES UR STRIP.AUTO-MCNC: NEGATIVE MG/DL
LEUKOCYTE ESTERASE UR QL STRIP.AUTO: ABNORMAL
NITRITE UR QL STRIP.AUTO: NEGATIVE
PH UR STRIP.AUTO: 5 [PH]
PROT UR STRIP.AUTO-MCNC: NEGATIVE MG/DL
RBC # UR STRIP.AUTO: ABNORMAL /UL
RBC #/AREA URNS AUTO: ABNORMAL /HPF
SP GR UR STRIP.AUTO: 1.01
SQUAMOUS #/AREA URNS AUTO: ABNORMAL /HPF
UROBILINOGEN UR STRIP.AUTO-MCNC: <2 MG/DL
WBC #/AREA URNS AUTO: ABNORMAL /HPF

## 2024-09-18 PROCEDURE — 81001 URINALYSIS AUTO W/SCOPE: CPT

## 2024-09-27 ENCOUNTER — HOSPITAL ENCOUNTER (OUTPATIENT)
Dept: RADIOLOGY | Facility: HOSPITAL | Age: 89
Discharge: HOME | End: 2024-09-27
Payer: MEDICARE

## 2024-09-27 ENCOUNTER — LAB (OUTPATIENT)
Dept: LAB | Facility: LAB | Age: 89
End: 2024-09-27
Payer: MEDICARE

## 2024-09-27 DIAGNOSIS — Z85.038 HISTORY OF COLON CANCER: ICD-10-CM

## 2024-09-27 PROCEDURE — 82378 CARCINOEMBRYONIC ANTIGEN: CPT

## 2024-09-27 PROCEDURE — 74177 CT ABD & PELVIS W/CONTRAST: CPT

## 2024-09-27 PROCEDURE — 2550000001 HC RX 255 CONTRASTS: Performed by: SURGERY

## 2024-09-27 PROCEDURE — 36415 COLL VENOUS BLD VENIPUNCTURE: CPT

## 2024-09-28 LAB — CEA SERPL-MCNC: 322.2 UG/L

## 2024-09-30 ENCOUNTER — TELEPHONE (OUTPATIENT)
Dept: SURGERY | Facility: CLINIC | Age: 89
End: 2024-09-30
Payer: COMMERCIAL

## 2024-09-30 NOTE — TELEPHONE ENCOUNTER
----- Message from Thanh Felix sent at 9/28/2024  8:20 PM EDT -----  Let daughter know the cancer colon test is very elevated and we will wait to see what the CT scan shows

## 2024-10-01 ENCOUNTER — TELEPHONE (OUTPATIENT)
Dept: SURGERY | Facility: HOSPITAL | Age: 89
End: 2024-10-01
Payer: COMMERCIAL

## 2024-10-01 NOTE — TELEPHONE ENCOUNTER
I spoke to the patient's daughter today.  CT scan was performed which confirms metastatic colon cancer to the liver and entire abdomen.  The patient will be referred to hospice.  I spoke to the primary care physician with who will make the referral.  The daughter agreed to the plan as outlined and was thankful for the follow-up

## 2025-02-18 NOTE — PATIENT INSTRUCTIONS
I refilled your gabapentin.  Continue taking 100 mg at bedtime.    You are scheduled for bilateral L4-L5, L5-S1 RFA on 1/29/2024 in [place] with Dr. Diggs.  No aspirin this day.  If getting sedation then you must not eat or drink 6 hours before the procedure and you must have a  with you.  The office will call you for further instructions.  You will receive Cipro before the procedure due to your history of right total hip replacement.  I will then see you for your postprocedure follow-up in 8 to 12 weeks.   Detail Level: Detailed